# Patient Record
Sex: FEMALE | Race: WHITE | NOT HISPANIC OR LATINO | Employment: OTHER | URBAN - METROPOLITAN AREA
[De-identification: names, ages, dates, MRNs, and addresses within clinical notes are randomized per-mention and may not be internally consistent; named-entity substitution may affect disease eponyms.]

---

## 2017-08-09 RX ORDER — GABAPENTIN 400 MG/1
300 CAPSULE ORAL 3 TIMES DAILY
COMMUNITY
End: 2023-02-03

## 2017-08-09 RX ORDER — FENTANYL 100 UG/H
1 PATCH TRANSDERMAL
COMMUNITY
End: 2017-08-25

## 2017-08-10 ENCOUNTER — OFFICE VISIT (OUTPATIENT)
Dept: NEUROSURGERY | Facility: CLINIC | Age: 67
End: 2017-08-10

## 2017-08-10 VITALS — HEIGHT: 64 IN | TEMPERATURE: 96.7 F | BODY MASS INDEX: 21.17 KG/M2 | WEIGHT: 124 LBS

## 2017-08-10 DIAGNOSIS — M51.36 DDD (DEGENERATIVE DISC DISEASE), LUMBAR: ICD-10-CM

## 2017-08-10 DIAGNOSIS — M48.062 LUMBAR STENOSIS WITH NEUROGENIC CLAUDICATION: Primary | ICD-10-CM

## 2017-08-10 PROCEDURE — 99203 OFFICE O/P NEW LOW 30 MIN: CPT | Performed by: NEUROLOGICAL SURGERY

## 2017-08-10 NOTE — PROGRESS NOTES
Subjective   Patient ID: Glenda Brown is a 66 y.o. female is being seen for consultation today at the request of Ranjan Felix MD  Chief Complaint: Back and leg pain    History of Present Illness: The patient is a 66-year-old woman with a complaint of back pain that radiates to both hips and legs, particularly with standing and walking.  She has been treated for back pain for approximately 10 years, with pain management for approximately 8 years.  She has had difficulty taking OxyContin and morphine long-acting, with report of overdosing on each.  She currently uses fentanyl cutaneous patch 75 µg per hour, supplemented by oxycodone 15 mg 4 times a day if needed.  She also takes gabapentin 404 times a day and cyclobenzaprine 10 mg at bedtime as needed.  Her symptoms of pain have change in the past year and become unresponsive to the treatments, which have included epidural injections, facet injections, facet rhizotomy, and physical therapy.    Review of Radiographic Studies:   Lumbar MRI scan shows a moderately severe stenosis at L4 5 is the principal cause of what appears to be neurogenic claudication.    The following portions of the patient's history were reviewed, updated as appropriate and approved: allergies, current medications, past family history, past medical history, past social history, past surgical history, review of systems and problem list.    Review of Systems   Constitutional: Positive for activity change, appetite change, chills, fatigue and unexpected weight change. Negative for diaphoresis and fever.   HENT: Negative for congestion, dental problem, drooling, ear discharge, ear pain, facial swelling, hearing loss, mouth sores, nosebleeds, postnasal drip, rhinorrhea, sinus pressure, sneezing, sore throat, tinnitus, trouble swallowing and voice change.    Eyes: Negative for photophobia, pain, discharge, redness, itching and visual disturbance.   Respiratory: Positive for cough. Negative for  apnea, choking, chest tightness, shortness of breath, wheezing and stridor.    Cardiovascular: Negative for chest pain, palpitations and leg swelling.   Gastrointestinal: Positive for nausea. Negative for abdominal distention, abdominal pain, anal bleeding, blood in stool, constipation, diarrhea, rectal pain and vomiting.   Endocrine: Positive for cold intolerance and polydipsia. Negative for heat intolerance, polyphagia and polyuria.   Genitourinary: Negative for decreased urine volume, difficulty urinating, dysuria, enuresis, flank pain, frequency, genital sores, hematuria and urgency.   Musculoskeletal: Positive for arthralgias, back pain, gait problem and myalgias. Negative for joint swelling, neck pain and neck stiffness.   Skin: Negative for color change, pallor, rash and wound.   Allergic/Immunologic: Negative for environmental allergies, food allergies and immunocompromised state.   Neurological: Positive for weakness and headaches. Negative for dizziness, tremors, seizures, syncope, facial asymmetry, speech difficulty, light-headedness and numbness.   Hematological: Negative for adenopathy. Does not bruise/bleed easily.   Psychiatric/Behavioral: Positive for dysphoric mood and sleep disturbance. Negative for agitation, behavioral problems, confusion, decreased concentration, hallucinations, self-injury and suicidal ideas. The patient is nervous/anxious. The patient is not hyperactive.    All other systems reviewed and are negative.      Objective     NEUROLOGICAL EXAMINATION:      MENTAL STATUS:  Alert and oriented.  Speech intact.  Recent and remote memory intact.      CRANIAL NERVES:  Cranial nerve II:  Visual fields are full to confrontation.  Cranial nerves III, IV and VI:  PERRLADC.  Extraocular movements are intact.  Nystagmus is not present.  Cranial nerve V:  Facial sensation is intact to light touch.  Cranial nerve VII:  Muscles of facial expression reveal no asymmetry.  Cranial nerve VIII:   Hearing is intact to finger rub bilaterally.  Cranial nerves IX and X:  Palate elevates symmetrically.  Cranial nerve XI:  Shoulder shrug is intact.  Cranial nerve XII:  Tongue is midline without evidence of atrophy or fasciculation.    MUSCULOSKELETAL:  SLR negative. Kody's test negative.    MOTOR:  Deltoid, biceps, triceps, and  strength intact.  No hand atrophy.  Hip flexion, knee extension, ankle dorsiflexion and plantar flexion intact. No tremor, spasticity, ataxia, or dysmetria.    SENSATION:  Intact to touch upper and lower extremities.  Position sense intact.    REFLEXES:  DTR trace in the knees and absent in the ankles.      Assessment   Chronic back pain, now complicated by neurogenic claudication, with moderately severe stenosis at L4 5.       Plan   Minimal access decompressive laminectomy L4 5 to relieve the neurogenic claudication.  This should return her to a state in which her chronic pain management should be effective for the relief that she needs.       Anders Aponte MD

## 2017-08-11 PROBLEM — M48.062 LUMBAR STENOSIS WITH NEUROGENIC CLAUDICATION: Status: ACTIVE | Noted: 2017-08-11

## 2017-08-15 DIAGNOSIS — M48.062 SPINAL STENOSIS, LUMBAR REGION, WITH NEUROGENIC CLAUDICATION: Primary | ICD-10-CM

## 2017-08-15 DIAGNOSIS — R79.9 ABNORMAL FINDING OF BLOOD CHEMISTRY: ICD-10-CM

## 2017-08-15 RX ORDER — SODIUM CHLORIDE, SODIUM LACTATE, POTASSIUM CHLORIDE, CALCIUM CHLORIDE 600; 310; 30; 20 MG/100ML; MG/100ML; MG/100ML; MG/100ML
100 INJECTION, SOLUTION INTRAVENOUS CONTINUOUS
Status: CANCELLED | OUTPATIENT
Start: 2017-08-15

## 2017-08-15 RX ORDER — CHLORHEXIDINE GLUCONATE 4 G/100ML
SOLUTION TOPICAL
Qty: 120 ML | Status: ON HOLD
Start: 2017-08-15 | End: 2017-09-01

## 2017-08-15 RX ORDER — SODIUM CHLORIDE 0.9 % (FLUSH) 0.9 %
1-10 SYRINGE (ML) INJECTION AS NEEDED
Status: CANCELLED | OUTPATIENT
Start: 2017-08-15

## 2017-08-15 NOTE — H&P
HISTORY AND PHYSICAL for SURGERY    Referring Provider: Ranjan Felix MD    Chief complaint: Back and leg pain    Subjective .   History of present illness:    The patient is a 66-year-old woman with a complaint of back pain that radiates to both hips and legs, particularly with standing and walking.  She has been treated for back pain for approximately 10 years, with pain management for approximately 8 years.  She has had difficulty taking OxyContin and morphine long-acting, with report of overdosing on each.  She currently uses fentanyl cutaneous patch 75 µg per hour, supplemented by oxycodone 15 mg 4 times a day if needed.  She also takes gabapentin 404 times a day and cyclobenzaprine 10 mg at bedtime as needed.  Her symptoms of pain have change in the past year and become unresponsive to the treatments, which have included epidural injections, facet injections, facet rhizotomy, and physical therapy.    History  Past Medical History:   Diagnosis Date   • Headache    • Low back pain    • 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    • Other intervertebral disc degeneration, lumbar region    • Other spondylosis with radiculopathy, lumbar region    • Radiculopathy of lumbar region    • Spondylosis without myelopathy or radiculopathy, lumbar region    • Thyroid disease     No past surgical history on file. Family history is unknown by patient.   Social History     Social History   • Marital status:      Spouse name: N/A   • Number of children: N/A   • Years of education: N/A     Social History Main Topics   • Smoking status: Current Every Day Smoker   • Smokeless tobacco: Not on file   • Alcohol use No   • Drug use: No   • Sexual activity: Defer     Other Topics Concern   • Not on file     Social History Narrative       Allergies:  Morphine sulfate and Oxycontin [oxycodone hcl]    Objective   Review of Systems  Constitutional: Positive for activity change, appetite change,  chills, fatigue and unexpected weight change. Negative for diaphoresis and fever.   HENT: Negative for congestion, dental problem, drooling, ear discharge, ear pain, facial swelling, hearing loss, mouth sores, nosebleeds, postnasal drip, rhinorrhea, sinus pressure, sneezing, sore throat, tinnitus, trouble swallowing and voice change.    Eyes: Negative for photophobia, pain, discharge, redness, itching and visual disturbance.   Respiratory: Positive for cough. Negative for apnea, choking, chest tightness, shortness of breath, wheezing and stridor.    Cardiovascular: Negative for chest pain, palpitations and leg swelling.   Gastrointestinal: Positive for nausea. Negative for abdominal distention, abdominal pain, anal bleeding, blood in stool, constipation, diarrhea, rectal pain and vomiting.   Endocrine: Positive for cold intolerance and polydipsia. Negative for heat intolerance, polyphagia and polyuria.   Genitourinary: Negative for decreased urine volume, difficulty urinating, dysuria, enuresis, flank pain, frequency, genital sores, hematuria and urgency.   Musculoskeletal: Positive for arthralgias, back pain, gait problem and myalgias. Negative for joint swelling, neck pain and neck stiffness.   Skin: Negative for color change, pallor, rash and wound.   Allergic/Immunologic: Negative for environmental allergies, food allergies and immunocompromised state.   Neurological: Positive for weakness and headaches. Negative for dizziness, tremors, seizures, syncope, facial asymmetry, speech difficulty, light-headedness and numbness.   Hematological: Negative for adenopathy. Does not bruise/bleed easily.   Psychiatric/Behavioral: Positive for dysphoric mood and sleep disturbance. Negative for agitation, behavioral problems, confusion, decreased concentration, hallucinations, self-injury and suicidal ideas. The patient is nervous/anxious. The patient is not hyperactive.     Physical Exam:  NEUROLOGICAL EXAMINATION:        MENTAL STATUS:  Alert and oriented.  Speech intact.  Recent and remote memory intact.       CRANIAL NERVES:  Cranial nerve II:  Visual fields are full to confrontation.  Cranial nerves III, IV and VI:  PERRLADC.  Extraocular movements are intact.  Nystagmus is not present.  Cranial nerve V:  Facial sensation is intact to light touch.  Cranial nerve VII:  Muscles of facial expression reveal no asymmetry.  Cranial nerve VIII:  Hearing is intact to finger rub bilaterally.  Cranial nerves IX and X:  Palate elevates symmetrically.  Cranial nerve XI:  Shoulder shrug is intact.  Cranial nerve XII:  Tongue is midline without evidence of atrophy or fasciculation.     MUSCULOSKELETAL:  SLR negative. Kody's test negative.     MOTOR:  Deltoid, biceps, triceps, and  strength intact.  No hand atrophy.  Hip flexion, knee extension, ankle dorsiflexion and plantar flexion intact. No tremor, spasticity, ataxia, or dysmetria.     SENSATION:  Intact to touch upper and lower extremities.  Position sense intact.     REFLEXES:  DTR trace in the knees and absent in the ankles.      Results Review:  Lumbar MRI scan shows a moderately severe stenosis at L4 5 is the principal cause of what appears to be neurogenic claudication.    Assessment/Plan   Assessment       Chronic back pain, now complicated by neurogenic claudication, with moderately severe stenosis at L4 5.     Plan       Minimal access decompressive laminectomy L4 5 to relieve the neurogenic claudication.  This should return her to a state in which her chronic pain management should be effective for the relief that she needs.      Teresa Steele PA-C  On behalf of Anders Aponte MD  08/15/17  10:29 AM

## 2017-08-25 ENCOUNTER — APPOINTMENT (OUTPATIENT)
Dept: PREADMISSION TESTING | Facility: HOSPITAL | Age: 67
End: 2017-08-25

## 2017-08-25 VITALS — BODY MASS INDEX: 23.31 KG/M2 | WEIGHT: 123.46 LBS | HEIGHT: 61 IN

## 2017-08-25 DIAGNOSIS — R79.9 ABNORMAL FINDING OF BLOOD CHEMISTRY: ICD-10-CM

## 2017-08-25 DIAGNOSIS — M48.062 SPINAL STENOSIS, LUMBAR REGION, WITH NEUROGENIC CLAUDICATION: ICD-10-CM

## 2017-08-25 LAB
ALBUMIN SERPL-MCNC: 3.7 G/DL (ref 3.2–4.8)
ALBUMIN/GLOB SERPL: 1.4 G/DL (ref 1.5–2.5)
ALP SERPL-CCNC: 115 U/L (ref 25–100)
ALT SERPL W P-5'-P-CCNC: 7 U/L (ref 7–40)
ANION GAP SERPL CALCULATED.3IONS-SCNC: -4 MMOL/L (ref 3–11)
AST SERPL-CCNC: 15 U/L (ref 0–33)
BILIRUB SERPL-MCNC: 0.3 MG/DL (ref 0.3–1.2)
BUN BLD-MCNC: 10 MG/DL (ref 9–23)
BUN/CREAT SERPL: 14.3 (ref 7–25)
CALCIUM SPEC-SCNC: 9.8 MG/DL (ref 8.7–10.4)
CHLORIDE SERPL-SCNC: 107 MMOL/L (ref 99–109)
CO2 SERPL-SCNC: 36 MMOL/L (ref 20–31)
CREAT BLD-MCNC: 0.7 MG/DL (ref 0.6–1.3)
DEPRECATED RDW RBC AUTO: 56.9 FL (ref 37–54)
ERYTHROCYTE [DISTWIDTH] IN BLOOD BY AUTOMATED COUNT: 15.9 % (ref 11.3–14.5)
GFR SERPL CREATININE-BSD FRML MDRD: 84 ML/MIN/1.73
GLOBULIN UR ELPH-MCNC: 2.7 GM/DL
GLUCOSE BLD-MCNC: 97 MG/DL (ref 70–100)
HBA1C MFR BLD: 6 % (ref 4.8–5.6)
HCT VFR BLD AUTO: 42.8 % (ref 34.5–44)
HGB BLD-MCNC: 14 G/DL (ref 11.5–15.5)
MCH RBC QN AUTO: 31.9 PG (ref 27–31)
MCHC RBC AUTO-ENTMCNC: 32.7 G/DL (ref 32–36)
MCV RBC AUTO: 97.5 FL (ref 80–99)
PLATELET # BLD AUTO: 210 10*3/MM3 (ref 150–450)
PMV BLD AUTO: 10.5 FL (ref 6–12)
POTASSIUM BLD-SCNC: 4.3 MMOL/L (ref 3.5–5.5)
PROT SERPL-MCNC: 6.4 G/DL (ref 5.7–8.2)
RBC # BLD AUTO: 4.39 10*6/MM3 (ref 3.89–5.14)
SODIUM BLD-SCNC: 139 MMOL/L (ref 132–146)
WBC NRBC COR # BLD: 7.17 10*3/MM3 (ref 3.5–10.8)

## 2017-08-25 PROCEDURE — 80053 COMPREHEN METABOLIC PANEL: CPT | Performed by: NEUROLOGICAL SURGERY

## 2017-08-25 PROCEDURE — 85027 COMPLETE CBC AUTOMATED: CPT | Performed by: NEUROLOGICAL SURGERY

## 2017-08-25 PROCEDURE — 36415 COLL VENOUS BLD VENIPUNCTURE: CPT

## 2017-08-25 PROCEDURE — 83036 HEMOGLOBIN GLYCOSYLATED A1C: CPT | Performed by: NEUROLOGICAL SURGERY

## 2017-08-25 PROCEDURE — 93010 ELECTROCARDIOGRAM REPORT: CPT | Performed by: INTERNAL MEDICINE

## 2017-08-25 PROCEDURE — 93005 ELECTROCARDIOGRAM TRACING: CPT

## 2017-08-25 PROCEDURE — 87081 CULTURE SCREEN ONLY: CPT | Performed by: NEUROLOGICAL SURGERY

## 2017-08-25 RX ORDER — RANITIDINE 150 MG/1
150 TABLET ORAL AS NEEDED
COMMUNITY
End: 2020-01-23

## 2017-08-25 RX ORDER — LEVOTHYROXINE SODIUM 0.05 MG/1
TABLET ORAL DAILY
COMMUNITY
End: 2022-08-25

## 2017-08-25 RX ORDER — FENTANYL 75 UG/H
1 PATCH TRANSDERMAL
COMMUNITY
End: 2023-02-03

## 2017-08-27 LAB — MRSA SPEC QL CULT: NORMAL

## 2017-08-31 ENCOUNTER — ANESTHESIA EVENT (OUTPATIENT)
Dept: PERIOP | Facility: HOSPITAL | Age: 67
End: 2017-08-31

## 2017-08-31 RX ORDER — FAMOTIDINE 10 MG/ML
20 INJECTION, SOLUTION INTRAVENOUS ONCE
Status: CANCELLED | OUTPATIENT
Start: 2017-08-31 | End: 2017-08-31

## 2017-09-01 ENCOUNTER — HOSPITAL ENCOUNTER (OUTPATIENT)
Facility: HOSPITAL | Age: 67
Discharge: HOME | End: 2017-09-02
Attending: NEUROLOGICAL SURGERY | Admitting: NEUROLOGICAL SURGERY

## 2017-09-01 ENCOUNTER — APPOINTMENT (OUTPATIENT)
Dept: GENERAL RADIOLOGY | Facility: HOSPITAL | Age: 67
End: 2017-09-01

## 2017-09-01 ENCOUNTER — ANESTHESIA (OUTPATIENT)
Dept: PERIOP | Facility: HOSPITAL | Age: 67
End: 2017-09-01

## 2017-09-01 DIAGNOSIS — Z74.09 IMPAIRED MOBILITY AND ADLS: Primary | ICD-10-CM

## 2017-09-01 DIAGNOSIS — M48.062 SPINAL STENOSIS, LUMBAR REGION, WITH NEUROGENIC CLAUDICATION: ICD-10-CM

## 2017-09-01 DIAGNOSIS — Z74.09 IMPAIRED FUNCTIONAL MOBILITY, BALANCE, GAIT, AND ENDURANCE: ICD-10-CM

## 2017-09-01 DIAGNOSIS — Z78.9 IMPAIRED MOBILITY AND ADLS: Primary | ICD-10-CM

## 2017-09-01 PROBLEM — M51.26 HERNIATION OF LUMBAR INTERVERTEBRAL DISC: Status: ACTIVE | Noted: 2017-09-01

## 2017-09-01 PROCEDURE — 25010000002 NEOSTIGMINE PER 0.5 MG: Performed by: NURSE ANESTHETIST, CERTIFIED REGISTERED

## 2017-09-01 PROCEDURE — A9270 NON-COVERED ITEM OR SERVICE: HCPCS | Performed by: NEUROLOGICAL SURGERY

## 2017-09-01 PROCEDURE — 63710000001 FENTANYL 75 MCG/HR PATCH 72 HOUR: Performed by: NEUROLOGICAL SURGERY

## 2017-09-01 PROCEDURE — 25010000002 FENTANYL CITRATE (PF) 100 MCG/2ML SOLUTION: Performed by: NURSE ANESTHETIST, CERTIFIED REGISTERED

## 2017-09-01 PROCEDURE — 25010000002 PROPOFOL 10 MG/ML EMULSION: Performed by: NURSE ANESTHETIST, CERTIFIED REGISTERED

## 2017-09-01 PROCEDURE — 25010000002 MIDAZOLAM PER 1 MG: Performed by: ANESTHESIOLOGY

## 2017-09-01 PROCEDURE — 63710000001 GABAPENTIN 300 MG CAPSULE: Performed by: NEUROLOGICAL SURGERY

## 2017-09-01 PROCEDURE — 63710000001 FAMOTIDINE 20 MG TABLET: Performed by: ANESTHESIOLOGY

## 2017-09-01 PROCEDURE — 25010000002 DEXAMETHASONE PER 1 MG: Performed by: NURSE ANESTHETIST, CERTIFIED REGISTERED

## 2017-09-01 PROCEDURE — C2617 STENT, NON-COR, TEM W/O DEL: HCPCS | Performed by: NEUROLOGICAL SURGERY

## 2017-09-01 PROCEDURE — 25010000002 ONDANSETRON PER 1 MG: Performed by: NURSE ANESTHETIST, CERTIFIED REGISTERED

## 2017-09-01 PROCEDURE — 63710000001 LEVOTHYROXINE 50 MCG TABLET: Performed by: NEUROLOGICAL SURGERY

## 2017-09-01 PROCEDURE — A9270 NON-COVERED ITEM OR SERVICE: HCPCS | Performed by: ANESTHESIOLOGY

## 2017-09-01 PROCEDURE — 25010000003 CEFAZOLIN IN DEXTROSE 2-4 GM/100ML-% SOLUTION: Performed by: NEUROLOGICAL SURGERY

## 2017-09-01 PROCEDURE — 63047 LAM FACETEC & FORAMOT LUMBAR: CPT | Performed by: NEUROLOGICAL SURGERY

## 2017-09-01 PROCEDURE — 25010000002 FENTANYL CITRATE (PF) 100 MCG/2ML SOLUTION: Performed by: ANESTHESIOLOGY

## 2017-09-01 PROCEDURE — 25010000002 HYDROMORPHONE PER 4 MG: Performed by: ANESTHESIOLOGY

## 2017-09-01 PROCEDURE — 25010000002 HYDROMORPHONE PER 4 MG: Performed by: NEUROLOGICAL SURGERY

## 2017-09-01 PROCEDURE — 63710000001 OXYCODONE 15 MG TABLET: Performed by: NEUROLOGICAL SURGERY

## 2017-09-01 PROCEDURE — 76000 FLUOROSCOPY <1 HR PHYS/QHP: CPT

## 2017-09-01 PROCEDURE — 94799 UNLISTED PULMONARY SVC/PX: CPT

## 2017-09-01 RX ORDER — ONDANSETRON 2 MG/ML
4 INJECTION INTRAMUSCULAR; INTRAVENOUS ONCE AS NEEDED
Status: DISCONTINUED | OUTPATIENT
Start: 2017-09-01 | End: 2017-09-01 | Stop reason: HOSPADM

## 2017-09-01 RX ORDER — MAGNESIUM HYDROXIDE 1200 MG/15ML
LIQUID ORAL AS NEEDED
Status: DISCONTINUED | OUTPATIENT
Start: 2017-09-01 | End: 2017-09-01 | Stop reason: HOSPADM

## 2017-09-01 RX ORDER — ONDANSETRON 2 MG/ML
INJECTION INTRAMUSCULAR; INTRAVENOUS AS NEEDED
Status: DISCONTINUED | OUTPATIENT
Start: 2017-09-01 | End: 2017-09-01 | Stop reason: SURG

## 2017-09-01 RX ORDER — CEFAZOLIN SODIUM 2 G/100ML
2 INJECTION, SOLUTION INTRAVENOUS ONCE
Status: COMPLETED | OUTPATIENT
Start: 2017-09-01 | End: 2017-09-01

## 2017-09-01 RX ORDER — DOCUSATE SODIUM 100 MG/1
100 CAPSULE, LIQUID FILLED ORAL 2 TIMES DAILY PRN
Status: DISCONTINUED | OUTPATIENT
Start: 2017-09-01 | End: 2017-09-02 | Stop reason: HOSPADM

## 2017-09-01 RX ORDER — SODIUM CHLORIDE 0.9 % (FLUSH) 0.9 %
1-10 SYRINGE (ML) INJECTION AS NEEDED
Status: DISCONTINUED | OUTPATIENT
Start: 2017-09-01 | End: 2017-09-01 | Stop reason: SDUPTHER

## 2017-09-01 RX ORDER — MIDAZOLAM HYDROCHLORIDE 1 MG/ML
1 INJECTION INTRAMUSCULAR; INTRAVENOUS
Status: COMPLETED | OUTPATIENT
Start: 2017-09-01 | End: 2017-09-01

## 2017-09-01 RX ORDER — BISACODYL 10 MG
10 SUPPOSITORY, RECTAL RECTAL DAILY PRN
Status: DISCONTINUED | OUTPATIENT
Start: 2017-09-01 | End: 2017-09-02 | Stop reason: HOSPADM

## 2017-09-01 RX ORDER — ONDANSETRON 2 MG/ML
4 INJECTION INTRAMUSCULAR; INTRAVENOUS EVERY 6 HOURS PRN
Status: DISCONTINUED | OUTPATIENT
Start: 2017-09-01 | End: 2017-09-02 | Stop reason: HOSPADM

## 2017-09-01 RX ORDER — GABAPENTIN 300 MG/1
300 CAPSULE ORAL 3 TIMES DAILY
Status: DISCONTINUED | OUTPATIENT
Start: 2017-09-01 | End: 2017-09-02 | Stop reason: HOSPADM

## 2017-09-01 RX ORDER — FAMOTIDINE 10 MG/ML
20 INJECTION, SOLUTION INTRAVENOUS ONCE
Status: DISCONTINUED | OUTPATIENT
Start: 2017-09-01 | End: 2017-09-01 | Stop reason: SDUPTHER

## 2017-09-01 RX ORDER — BUPIVACAINE HYDROCHLORIDE AND EPINEPHRINE 2.5; 5 MG/ML; UG/ML
INJECTION, SOLUTION EPIDURAL; INFILTRATION; INTRACAUDAL; PERINEURAL AS NEEDED
Status: DISCONTINUED | OUTPATIENT
Start: 2017-09-01 | End: 2017-09-01 | Stop reason: HOSPADM

## 2017-09-01 RX ORDER — FAMOTIDINE 20 MG/1
20 TABLET, FILM COATED ORAL ONCE
Status: COMPLETED | OUTPATIENT
Start: 2017-09-01 | End: 2017-09-01

## 2017-09-01 RX ORDER — LIDOCAINE HYDROCHLORIDE 10 MG/ML
0.5 INJECTION, SOLUTION EPIDURAL; INFILTRATION; INTRACAUDAL; PERINEURAL ONCE AS NEEDED
Status: COMPLETED | OUTPATIENT
Start: 2017-09-01 | End: 2017-09-01

## 2017-09-01 RX ORDER — ATORVASTATIN CALCIUM 10 MG/1
10 TABLET, FILM COATED ORAL DAILY
Status: DISCONTINUED | OUTPATIENT
Start: 2017-09-02 | End: 2017-09-02 | Stop reason: HOSPADM

## 2017-09-01 RX ORDER — HYDROMORPHONE HYDROCHLORIDE 1 MG/ML
0.5 INJECTION, SOLUTION INTRAMUSCULAR; INTRAVENOUS; SUBCUTANEOUS
Status: DISCONTINUED | OUTPATIENT
Start: 2017-09-01 | End: 2017-09-01 | Stop reason: HOSPADM

## 2017-09-01 RX ORDER — SODIUM CHLORIDE, SODIUM LACTATE, POTASSIUM CHLORIDE, CALCIUM CHLORIDE 600; 310; 30; 20 MG/100ML; MG/100ML; MG/100ML; MG/100ML
100 INJECTION, SOLUTION INTRAVENOUS CONTINUOUS
Status: DISCONTINUED | OUTPATIENT
Start: 2017-09-01 | End: 2017-09-02 | Stop reason: HOSPADM

## 2017-09-01 RX ORDER — FAMOTIDINE 20 MG/1
20 TABLET, FILM COATED ORAL DAILY
Status: DISCONTINUED | OUTPATIENT
Start: 2017-09-02 | End: 2017-09-02 | Stop reason: HOSPADM

## 2017-09-01 RX ORDER — DIPHENHYDRAMINE HCL 25 MG
25 CAPSULE ORAL NIGHTLY PRN
Status: DISCONTINUED | OUTPATIENT
Start: 2017-09-01 | End: 2017-09-02 | Stop reason: HOSPADM

## 2017-09-01 RX ORDER — OXYCODONE HYDROCHLORIDE 15 MG/1
15 TABLET ORAL 4 TIMES DAILY PRN
Status: DISCONTINUED | OUTPATIENT
Start: 2017-09-01 | End: 2017-09-02 | Stop reason: HOSPADM

## 2017-09-01 RX ORDER — GLYCOPYRROLATE 0.2 MG/ML
INJECTION INTRAMUSCULAR; INTRAVENOUS AS NEEDED
Status: DISCONTINUED | OUTPATIENT
Start: 2017-09-01 | End: 2017-09-01 | Stop reason: SURG

## 2017-09-01 RX ORDER — DEXAMETHASONE SODIUM PHOSPHATE 4 MG/ML
INJECTION, SOLUTION INTRA-ARTICULAR; INTRALESIONAL; INTRAMUSCULAR; INTRAVENOUS; SOFT TISSUE AS NEEDED
Status: DISCONTINUED | OUTPATIENT
Start: 2017-09-01 | End: 2017-09-01 | Stop reason: SURG

## 2017-09-01 RX ORDER — SODIUM CHLORIDE 0.9 % (FLUSH) 0.9 %
1-10 SYRINGE (ML) INJECTION AS NEEDED
Status: DISCONTINUED | OUTPATIENT
Start: 2017-09-01 | End: 2017-09-01 | Stop reason: HOSPADM

## 2017-09-01 RX ORDER — FENTANYL CITRATE 50 UG/ML
50 INJECTION, SOLUTION INTRAMUSCULAR; INTRAVENOUS
Status: COMPLETED | OUTPATIENT
Start: 2017-09-01 | End: 2017-09-01

## 2017-09-01 RX ORDER — IBUPROFEN 600 MG/1
600 TABLET ORAL EVERY 6 HOURS PRN
Status: DISCONTINUED | OUTPATIENT
Start: 2017-09-01 | End: 2017-09-02 | Stop reason: HOSPADM

## 2017-09-01 RX ORDER — KETAMINE HCL IN 0.9 % NACL 50 MG/5 ML
15 SYRINGE (ML) INTRAVENOUS ONCE
Status: COMPLETED | OUTPATIENT
Start: 2017-09-01 | End: 2017-09-01

## 2017-09-01 RX ORDER — ATRACURIUM BESYLATE 10 MG/ML
INJECTION, SOLUTION INTRAVENOUS AS NEEDED
Status: DISCONTINUED | OUTPATIENT
Start: 2017-09-01 | End: 2017-09-01 | Stop reason: SURG

## 2017-09-01 RX ORDER — FAMOTIDINE 20 MG/1
20 TABLET, FILM COATED ORAL ONCE
Status: DISCONTINUED | OUTPATIENT
Start: 2017-09-01 | End: 2017-09-01 | Stop reason: SDUPTHER

## 2017-09-01 RX ORDER — LIDOCAINE HYDROCHLORIDE 10 MG/ML
INJECTION, SOLUTION INFILTRATION; PERINEURAL AS NEEDED
Status: DISCONTINUED | OUTPATIENT
Start: 2017-09-01 | End: 2017-09-01 | Stop reason: SURG

## 2017-09-01 RX ORDER — ACETAMINOPHEN 325 MG/1
650 TABLET ORAL EVERY 4 HOURS PRN
Status: DISCONTINUED | OUTPATIENT
Start: 2017-09-01 | End: 2017-09-02 | Stop reason: HOSPADM

## 2017-09-01 RX ORDER — SODIUM CHLORIDE, SODIUM LACTATE, POTASSIUM CHLORIDE, CALCIUM CHLORIDE 600; 310; 30; 20 MG/100ML; MG/100ML; MG/100ML; MG/100ML
9 INJECTION, SOLUTION INTRAVENOUS CONTINUOUS
Status: DISCONTINUED | OUTPATIENT
Start: 2017-09-01 | End: 2017-09-01 | Stop reason: SDUPTHER

## 2017-09-01 RX ORDER — PROMETHAZINE HYDROCHLORIDE 25 MG/1
25 TABLET ORAL DAILY PRN
Status: DISCONTINUED | OUTPATIENT
Start: 2017-09-01 | End: 2017-09-02 | Stop reason: HOSPADM

## 2017-09-01 RX ORDER — BISACODYL 5 MG/1
5 TABLET, DELAYED RELEASE ORAL DAILY PRN
Status: DISCONTINUED | OUTPATIENT
Start: 2017-09-01 | End: 2017-09-02 | Stop reason: HOSPADM

## 2017-09-01 RX ORDER — FENTANYL 75 UG/H
1 PATCH TRANSDERMAL
Status: DISCONTINUED | OUTPATIENT
Start: 2017-09-01 | End: 2017-09-02 | Stop reason: HOSPADM

## 2017-09-01 RX ORDER — PROPOFOL 10 MG/ML
VIAL (ML) INTRAVENOUS AS NEEDED
Status: DISCONTINUED | OUTPATIENT
Start: 2017-09-01 | End: 2017-09-01 | Stop reason: SURG

## 2017-09-01 RX ORDER — LEVOTHYROXINE SODIUM 0.05 MG/1
50 TABLET ORAL DAILY
Status: DISCONTINUED | OUTPATIENT
Start: 2017-09-01 | End: 2017-09-02 | Stop reason: HOSPADM

## 2017-09-01 RX ORDER — SENNA AND DOCUSATE SODIUM 50; 8.6 MG/1; MG/1
1 TABLET, FILM COATED ORAL NIGHTLY PRN
Status: DISCONTINUED | OUTPATIENT
Start: 2017-09-01 | End: 2017-09-02 | Stop reason: HOSPADM

## 2017-09-01 RX ORDER — CEFAZOLIN SODIUM 2 G/100ML
2 INJECTION, SOLUTION INTRAVENOUS EVERY 8 HOURS
Status: COMPLETED | OUTPATIENT
Start: 2017-09-01 | End: 2017-09-02

## 2017-09-01 RX ORDER — LIDOCAINE HYDROCHLORIDE 10 MG/ML
0.5 INJECTION, SOLUTION EPIDURAL; INFILTRATION; INTRACAUDAL; PERINEURAL ONCE AS NEEDED
Status: DISCONTINUED | OUTPATIENT
Start: 2017-09-01 | End: 2017-09-01 | Stop reason: SDUPTHER

## 2017-09-01 RX ORDER — ONDANSETRON 4 MG/1
4 TABLET, FILM COATED ORAL EVERY 6 HOURS PRN
Status: DISCONTINUED | OUTPATIENT
Start: 2017-09-01 | End: 2017-09-02 | Stop reason: HOSPADM

## 2017-09-01 RX ADMIN — SODIUM CHLORIDE, POTASSIUM CHLORIDE, SODIUM LACTATE AND CALCIUM CHLORIDE: 600; 310; 30; 20 INJECTION, SOLUTION INTRAVENOUS at 13:00

## 2017-09-01 RX ADMIN — FAMOTIDINE 20 MG: 20 TABLET ORAL at 09:14

## 2017-09-01 RX ADMIN — PROPOFOL 150 MG: 10 INJECTION, EMULSION INTRAVENOUS at 13:12

## 2017-09-01 RX ADMIN — FENTANYL CITRATE 50 MCG: 50 INJECTION, SOLUTION INTRAMUSCULAR; INTRAVENOUS at 11:07

## 2017-09-01 RX ADMIN — FENTANYL CITRATE 50 MCG: 50 INJECTION, SOLUTION INTRAMUSCULAR; INTRAVENOUS at 15:45

## 2017-09-01 RX ADMIN — FENTANYL CITRATE 50 MCG: 50 INJECTION, SOLUTION INTRAMUSCULAR; INTRAVENOUS at 10:32

## 2017-09-01 RX ADMIN — CEFAZOLIN SODIUM 2 G: 2 INJECTION, SOLUTION INTRAVENOUS at 13:09

## 2017-09-01 RX ADMIN — LIDOCAINE HYDROCHLORIDE 50 MG: 10 INJECTION, SOLUTION INFILTRATION; PERINEURAL at 13:12

## 2017-09-01 RX ADMIN — GABAPENTIN 300 MG: 300 CAPSULE ORAL at 23:24

## 2017-09-01 RX ADMIN — GLYCOPYRROLATE 0.4 MG: 0.2 INJECTION, SOLUTION INTRAMUSCULAR; INTRAVENOUS at 14:30

## 2017-09-01 RX ADMIN — FENTANYL CITRATE 50 MCG: 50 INJECTION, SOLUTION INTRAMUSCULAR; INTRAVENOUS at 15:10

## 2017-09-01 RX ADMIN — FENTANYL CITRATE 50 MCG: 50 INJECTION, SOLUTION INTRAMUSCULAR; INTRAVENOUS at 15:00

## 2017-09-01 RX ADMIN — LIDOCAINE HYDROCHLORIDE 0.5 ML: 10 INJECTION, SOLUTION EPIDURAL; INFILTRATION; INTRACAUDAL; PERINEURAL at 09:14

## 2017-09-01 RX ADMIN — FENTANYL CITRATE 50 MCG: 50 INJECTION, SOLUTION INTRAMUSCULAR; INTRAVENOUS at 09:05

## 2017-09-01 RX ADMIN — OXYCODONE HYDROCHLORIDE 15 MG: 15 TABLET ORAL at 17:23

## 2017-09-01 RX ADMIN — HYDROMORPHONE HYDROCHLORIDE 1 MG: 1 INJECTION, SOLUTION INTRAMUSCULAR; INTRAVENOUS; SUBCUTANEOUS at 21:52

## 2017-09-01 RX ADMIN — HYDROMORPHONE HYDROCHLORIDE 1 MG: 1 INJECTION, SOLUTION INTRAMUSCULAR; INTRAVENOUS; SUBCUTANEOUS at 19:14

## 2017-09-01 RX ADMIN — GABAPENTIN 300 MG: 300 CAPSULE ORAL at 16:58

## 2017-09-01 RX ADMIN — MIDAZOLAM HYDROCHLORIDE 1 MG: 1 INJECTION, SOLUTION INTRAMUSCULAR; INTRAVENOUS at 14:50

## 2017-09-01 RX ADMIN — KETAMINE HCL-NACL SOLN PREF SY 50 MG/5ML-0.9% (10MG/ML) 15 MG: 10 SOLUTION PREFILLED SYRINGE at 15:55

## 2017-09-01 RX ADMIN — OXYCODONE HYDROCHLORIDE 15 MG: 15 TABLET ORAL at 23:25

## 2017-09-01 RX ADMIN — ONDANSETRON 4 MG: 2 INJECTION INTRAMUSCULAR; INTRAVENOUS at 14:30

## 2017-09-01 RX ADMIN — ATRACURIUM BESYLATE 40 MG: 10 INJECTION, SOLUTION INTRAVENOUS at 13:12

## 2017-09-01 RX ADMIN — SODIUM CHLORIDE, POTASSIUM CHLORIDE, SODIUM LACTATE AND CALCIUM CHLORIDE 9 ML/HR: 600; 310; 30; 20 INJECTION, SOLUTION INTRAVENOUS at 09:14

## 2017-09-01 RX ADMIN — SODIUM CHLORIDE, POTASSIUM CHLORIDE, SODIUM LACTATE AND CALCIUM CHLORIDE 100 ML/HR: 600; 310; 30; 20 INJECTION, SOLUTION INTRAVENOUS at 15:09

## 2017-09-01 RX ADMIN — CEFAZOLIN SODIUM 2 G: 2 INJECTION, SOLUTION INTRAVENOUS at 20:22

## 2017-09-01 RX ADMIN — Medication 3 MG: at 14:30

## 2017-09-01 RX ADMIN — HYDROMORPHONE HYDROCHLORIDE 0.5 MG: 1 INJECTION, SOLUTION INTRAMUSCULAR; INTRAVENOUS; SUBCUTANEOUS at 16:10

## 2017-09-01 RX ADMIN — FENTANYL CITRATE 50 MCG: 50 INJECTION, SOLUTION INTRAMUSCULAR; INTRAVENOUS at 15:20

## 2017-09-01 RX ADMIN — MIDAZOLAM HYDROCHLORIDE 1 MG: 1 INJECTION, SOLUTION INTRAMUSCULAR; INTRAVENOUS at 15:00

## 2017-09-01 RX ADMIN — FENTANYL 1 PATCH: 75 PATCH, EXTENDED RELEASE TRANSDERMAL at 16:58

## 2017-09-01 RX ADMIN — DEXAMETHASONE SODIUM PHOSPHATE 4 MG: 4 INJECTION, SOLUTION INTRAMUSCULAR; INTRAVENOUS at 13:25

## 2017-09-01 RX ADMIN — LEVOTHYROXINE SODIUM 50 MCG: 50 TABLET ORAL at 16:58

## 2017-09-01 RX ADMIN — FENTANYL CITRATE 50 MCG: 50 INJECTION, SOLUTION INTRAMUSCULAR; INTRAVENOUS at 15:30

## 2017-09-01 NOTE — ANESTHESIA POSTPROCEDURE EVALUATION
Patient: Glenda Brown    Procedure Summary     Date Anesthesia Start Anesthesia Stop Room / Location    09/01/17 1300 1447  [facility] / [facility]       Procedure Diagnosis Surgeon Provider    LUMBAR LAMINECTOMY  L4-5 (N/A Spine Lumbar) Lumbar stenosis with neurogenic claudication  (Lumbar stenosis with neurogenic claudication [M48.06]) MD Yang Ventura MD          Anesthesia Type: general  Last vitals  /74       Temp     97.6   Pulse 80      Resp 16       SpO2     98     Post Anesthesia Care and Evaluation    Patient location during evaluation: PACU  Patient participation: complete - patient participated  Level of consciousness: sleepy but conscious  Pain score: 0  Pain management: adequate  Airway patency: patent  Anesthetic complications: No anesthetic complications  PONV Status: none  Cardiovascular status: hemodynamically stable and acceptable  Respiratory status: nonlabored ventilation, acceptable, nasal cannula and oral airway  Hydration status: acceptable

## 2017-09-01 NOTE — OP NOTE
OPERATIVE REPORT    DATE OF OPERATION: 9/1/17    PREOPERATIVE DIAGNOSIS: Lumbar stenosis L4-5    POSTOPERATIVE DIAGNOSIS: Same    PROCEDURE PERFORMED: Lumbar laminectomy L4-5    SURGEON: Anders Aponte MD    ASSISTANT: Teresa Steele PA-C    INDICATIONS: The patient had back pain with hip and leg pain consistent with neurogenic claudication.  MRI scan showed severe stenosis at L4 5.  Decompression was elected.    DESCRIPTON OF PROCEDURE: Surgery was performed under general anesthesia in the prone position on the laminectomy frame.  The back was prepared sterilely and draped.  The left L4 lamina was identified with a spinal needle and an AP fluoroscopic image.  An 18 mm incision was made and a guidewire and dilators were used to place a 4 cm length x 18 mm width tubular retractor.  AP fluoroscopy confirmed this to be the L4 5 level on the left.    A high-speed drill was used to perform the laminectomy, beginning on the ipsilateral side, and removing the inferior lamina of L4 up to the level of the L4 pedicle.  Drilling was extended to the floor lateral recess on the ipsilateral side, then the tube was angled across the midline to the contralateral side and drilling continued, removing the lamina until the ligamentum flavum was exposed and the lateral recess on the right side reached. The ligamentum flavum was exposured bilaterally. The laminectomy was extended cranially to the point where the ligamentum flavum thinned away and epidural fat was visible. The exposure was extended caudally to the level of the superior lamina of L5.  A 3 mm punch was used to remove the ligamentum flavum, beginning at the midline and partially removing the ligamentum on the ipsilateral side to expose the dura, then removing the ligamentum extensively into the lateral recess on the contralateral side until the floor of the lateral recess and the epidural veins were seen.  Gelfoam was placed in the far lateral recess for hemostasis.  The  tube was angled back to the ipsilateral side and the ligamentum flavum excision completed with the Kerrison punch until the floor the lateral recess and the epidural veins were seen and all dural compression was confirmed visually to be alleviated.  Removal of the ligament was extended caudally to the superior lamina of L5 and laterally to the level of the L5 pedicle. The ligamentum flavum was removed bilaterally across the superior margin of the L5 lamina. The bone was waxed as necessary for hemostasis.  The site was irrigated and Gelfoam used as necessary to complete hemostasis.  Because of a continued minor ooze of blood FloSeal was used to finally fill the laminectomy site and apply cottonoids into the lateral recess and over the bone surface to compress the FloSeal against it and this achieved complete hemostasis.  The tubular retractor was removed.  Marcaine was injected in the paraspinous muscle.  Subcuticular Vicryl closure was performed and Dermabond Prineo was applied to the skin.  Blood loss was 45 milliliters.    Anders Aponte M.D.

## 2017-09-01 NOTE — BRIEF OP NOTE
LUMBAR LAMINECTOMY DISCECTOMY MICRO ENDOSCOPIC  Procedure Note    Glenda Brown  9/1/2017    Pre-op Diagnosis:   Lumbar stenosis with neurogenic claudication [M48.06]    Post-op Diagnosis:     Post-Op Diagnosis Codes:     * Lumbar stenosis with neurogenic claudication [M48.06]    Procedure/CPT® Codes:      Procedure(s):  LUMBAR LAMINECTOMY  L4-5    Surgeon(s):  Anders Aponte MD    Anesthesia: General    Staff:   Circulator: Malgorzata Hoskins; Maya Harris RN  Scrub Person: Genesis Lemus  Nursing Assistant: Jerome Montes  Assistant: Teresa Steele PA-C    Estimated Blood Loss: *No blood loss documented*  Urine Voided: * No values recorded between 9/1/2017  1:00 PM and 9/1/2017  2:29 PM *    Specimens:                * No specimens in log *      Drains:           Findings: Stenosis L4-5    Complications: None      Anders Aponte MD     Date: 9/1/2017  Time: 2:29 PM

## 2017-09-01 NOTE — ANESTHESIA PREPROCEDURE EVALUATION
Anesthesia Evaluation     Patient summary reviewed and Nursing notes reviewed   NPO Solid Status: > 8 hours  NPO Liquid Status: > 8 hours     Airway   Mallampati: II  TM distance: >3 FB  Neck ROM: full  no difficulty expected  Dental      Pulmonary     breath sounds clear to auscultation  Cardiovascular     Rhythm: regular  Rate: normal        Neuro/Psych  (+) headaches, numbness,    GI/Hepatic/Renal/Endo    (+)  GERD,     Musculoskeletal     Abdominal    Substance History      OB/GYN          Other   (+) arthritis     ROS/Med Hx Other: Chronic back pain                                  Anesthesia Plan    ASA 3     general     intravenous induction   Anesthetic plan and risks discussed with patient.    Plan discussed with CRNA.

## 2017-09-01 NOTE — ANESTHESIA PROCEDURE NOTES
Airway  Urgency: elective    Airway not difficult    General Information and Staff    Patient location during procedure: OR  CRNA: JACK HAMILTON    Indications and Patient Condition  Indications for airway management: airway protection    Preoxygenated: yes  MILS not maintained throughout  Mask difficulty assessment: 1 - vent by mask    Final Airway Details  Final airway type: endotracheal airway      Successful airway: ETT  Cuffed: yes   Successful intubation technique: direct laryngoscopy  Facilitating devices/methods: intubating stylet  Endotracheal tube insertion site: oral  Blade: Briones  Blade size: #2  ETT size: 7.0 mm  Cormack-Lehane Classification: grade I - full view of glottis  Placement verified by: chest auscultation and capnometry   Measured from: lips  ETT to lips (cm): 20  Number of attempts at approach: 1    Additional Comments  Negative epigastric sounds, Breath sound equal bilaterally with symmetric chest rise and fall.  Atraumatic

## 2017-09-01 NOTE — INTERVAL H&P NOTE
"Pre-Op H&P (See Recent Office Note Attached)    Chief complaint: Back and BLE pain, R>L      Review of Systems:  General ROS:  no fever, chills, rashes, No change since last office visit  Cardiovascular ROS: no chest pain or dyspnea on exertion  Respiratory ROS: no cough,+baseline shortness of breath, or wheezing    Immunization History:   Influenza:  Yes 2016  Pneumococcal:  Yes < 5 years  Tetanus:  unknown    Vital Signs:  /79  Pulse 85  Temp 97 °F (36.1 °C) (Temporal Artery )   Resp 18  Ht 64\" (162.6 cm)  Wt 123 lb 7.3 oz (56 kg)  SpO2 95%  BMI 21.19 kg/m2    Physical Exam:    CV:  S1S2 regular rate and rhythm, no murmur               Resp:  Clear to auscultation; respirations regular, even and unlabored    Results Review:    I reviewed the patient's new clinical results.    Cancer Staging (if applicable)  Cancer Patient: __ yes _x_no __unknown; If yes, clinical stage T:__ N:__M:__, stage group or __N/A    Preeti Call, APRN  9/1/2017   9:20 AM      "

## 2017-09-02 VITALS
HEART RATE: 76 BPM | BODY MASS INDEX: 21.08 KG/M2 | TEMPERATURE: 97.5 F | RESPIRATION RATE: 16 BRPM | SYSTOLIC BLOOD PRESSURE: 112 MMHG | OXYGEN SATURATION: 97 % | DIASTOLIC BLOOD PRESSURE: 63 MMHG | HEIGHT: 64 IN | WEIGHT: 123.46 LBS

## 2017-09-02 LAB
HCT VFR BLD AUTO: 38.3 % (ref 34.5–44)
HGB BLD-MCNC: 12.5 G/DL (ref 11.5–15.5)

## 2017-09-02 PROCEDURE — A9270 NON-COVERED ITEM OR SERVICE: HCPCS | Performed by: NEUROLOGICAL SURGERY

## 2017-09-02 PROCEDURE — G8978 MOBILITY CURRENT STATUS: HCPCS

## 2017-09-02 PROCEDURE — G8980 MOBILITY D/C STATUS: HCPCS

## 2017-09-02 PROCEDURE — 85018 HEMOGLOBIN: CPT | Performed by: NEUROLOGICAL SURGERY

## 2017-09-02 PROCEDURE — G8987 SELF CARE CURRENT STATUS: HCPCS | Performed by: OCCUPATIONAL THERAPIST

## 2017-09-02 PROCEDURE — 63710000001 FAMOTIDINE 20 MG TABLET: Performed by: NEUROLOGICAL SURGERY

## 2017-09-02 PROCEDURE — 99024 POSTOP FOLLOW-UP VISIT: CPT | Performed by: NEUROLOGICAL SURGERY

## 2017-09-02 PROCEDURE — 25010000002 HYDROMORPHONE PER 4 MG: Performed by: NEUROLOGICAL SURGERY

## 2017-09-02 PROCEDURE — G8979 MOBILITY GOAL STATUS: HCPCS

## 2017-09-02 PROCEDURE — 63710000001 IBUPROFEN 600 MG TABLET: Performed by: NEUROLOGICAL SURGERY

## 2017-09-02 PROCEDURE — G8989 SELF CARE D/C STATUS: HCPCS | Performed by: OCCUPATIONAL THERAPIST

## 2017-09-02 PROCEDURE — 25010000003 CEFAZOLIN IN DEXTROSE 2-4 GM/100ML-% SOLUTION: Performed by: NEUROLOGICAL SURGERY

## 2017-09-02 PROCEDURE — G8988 SELF CARE GOAL STATUS: HCPCS | Performed by: OCCUPATIONAL THERAPIST

## 2017-09-02 PROCEDURE — 63710000001 GABAPENTIN 300 MG CAPSULE: Performed by: NEUROLOGICAL SURGERY

## 2017-09-02 PROCEDURE — 85014 HEMATOCRIT: CPT | Performed by: NEUROLOGICAL SURGERY

## 2017-09-02 PROCEDURE — 63710000001 ATORVASTATIN 10 MG TABLET: Performed by: NEUROLOGICAL SURGERY

## 2017-09-02 PROCEDURE — 97162 PT EVAL MOD COMPLEX 30 MIN: CPT

## 2017-09-02 PROCEDURE — 97110 THERAPEUTIC EXERCISES: CPT

## 2017-09-02 PROCEDURE — 63710000001 LEVOTHYROXINE 50 MCG TABLET: Performed by: NEUROLOGICAL SURGERY

## 2017-09-02 PROCEDURE — 63710000001 OXYCODONE 15 MG TABLET: Performed by: NEUROLOGICAL SURGERY

## 2017-09-02 RX ADMIN — HYDROMORPHONE HYDROCHLORIDE 1 MG: 1 INJECTION, SOLUTION INTRAMUSCULAR; INTRAVENOUS; SUBCUTANEOUS at 02:31

## 2017-09-02 RX ADMIN — OXYCODONE HYDROCHLORIDE 15 MG: 15 TABLET ORAL at 08:54

## 2017-09-02 RX ADMIN — LEVOTHYROXINE SODIUM 50 MCG: 50 TABLET ORAL at 05:08

## 2017-09-02 RX ADMIN — SODIUM CHLORIDE, POTASSIUM CHLORIDE, SODIUM LACTATE AND CALCIUM CHLORIDE 100 ML/HR: 600; 310; 30; 20 INJECTION, SOLUTION INTRAVENOUS at 02:20

## 2017-09-02 RX ADMIN — IBUPROFEN 600 MG: 600 TABLET, FILM COATED ORAL at 02:31

## 2017-09-02 RX ADMIN — ATORVASTATIN CALCIUM 10 MG: 10 TABLET, FILM COATED ORAL at 08:54

## 2017-09-02 RX ADMIN — GABAPENTIN 300 MG: 300 CAPSULE ORAL at 05:08

## 2017-09-02 RX ADMIN — FAMOTIDINE 20 MG: 20 TABLET ORAL at 08:54

## 2017-09-02 RX ADMIN — CEFAZOLIN SODIUM 2 G: 2 INJECTION, SOLUTION INTRAVENOUS at 05:08

## 2017-09-02 NOTE — DISCHARGE SUMMARY
DISCHARGE SUMMARY    Date of Admission: 9/1/2017    Date of Discharge:  9/2/2017    Discharge Diagnosis: Lumbar stenosis L4-5    Procedures Performed:  Procedure(s):  LUMBAR LAMINECTOMY  L4-5    Referring Physician: Ranjan Felix M.D.    Presenting Problem/History of Present Illness  Lumbar stenosis with neurogenic claudication [M48.06]  Herniation of lumbar intervertebral disc [M51.26]     Hospital Course  Patient is a 66 y.o. female who presented with pain in the back radiating to both hips and legs which occurred particularly with standing and walking.  She has received pain management treatment for back pain for about 8 years, currently using fentanyl 75 µg per hour patch supplemented by oxycodone 15 mg immediate release 4 times a day as needed.  Lumbar MRI scan showed a moderately severe stenosis at L4 5 as the apparent cause of neurogenic claudication, without evidence of spondylolisthesis.    On the day of admission a minimal access lumbar laminectomy at L4-5 was performed.  There were no surgical complications.  She complained of significant pain overnight in her back on the left side and although she felt improved pain in her right leg she complained of pain in her left leg.  It was difficult to get control of her pain with usual medications because of the chronic high-dose narcotic use preoperatively.  Nevertheless she resumed ambulation and normal bladder function.  There were no wound complications.  She was discharged home the morning following surgery, to continue her usual preoperative medications.  Follow-up will be scheduled in 4 weeks.    Discharge Medications   Glenda Brown   Home Medication Instructions DI:[id number]    Printed on:09/02/17 0742   Medication Information                      fentaNYL (DURAGESIC) 75 MCG/HR patch  Place 1 patch on the skin Every 72 (Seventy-Two) Hours.             gabapentin (NEURONTIN) 400 MG capsule  Take 300 mg by mouth 3 (Three) Times a Day.              ibuprofen (ADVIL,MOTRIN) 600 MG tablet  Take 600 mg by mouth Every 6 (Six) Hours As Needed for Mild Pain (1-3).             levothyroxine (SYNTHROID, LEVOTHROID) 50 MCG tablet  Take 50 mcg by mouth Daily.             OXYCODONE HCL PO  Take 15 mg by mouth 4 (Four) Times a Day As Needed (PAIN).             pravastatin (PRAVACHOL) 40 MG tablet  Take 40 mg by mouth Daily.             promethazine (PHENERGAN) 25 MG tablet  Take 25 mg by mouth Daily As Needed for Nausea or Vomiting.             raNITIdine (ZANTAC) 150 MG tablet  Take 150 mg by mouth As Needed for Heartburn.                 Anders Aponte MD  09/02/17  7:42 AM

## 2017-09-02 NOTE — PLAN OF CARE
Problem: Patient Care Overview (Adult)  Goal: Plan of Care Review  Outcome: Outcome(s) achieved Date Met:  09/02/17 09/02/17 0953   Coping/Psychosocial Response Interventions   Plan Of Care Reviewed With patient   Outcome Evaluation   Outcome Summary/Follow up Plan Pt s/p lumbar lami and now with improved symptoms of radiating pain; demo's good safety and balance with transfers/fxnl mobility and appropriate teach back with ADL retraining/AE management. Expectecd to d/c home this date with family assist         Problem: Inpatient Occupational Therapy  Goal: Patient Education Goal LTG- OT  Outcome: Outcome(s) achieved Date Met:  09/02/17 09/02/17 0953   Patient Education OT LTG   Patient Education OT LTG, Time to Achieve 2 - 3 days   Patient Education OT LTG, Education Type precautions per surgeon;posture/body mechanics;home safety;positioning;adaptive equipment mgmt   Patient Education OT LTG, Education Understanding demonstrates adequately;verbalizes understanding   Patient Education OT LTG Outcome goal met

## 2017-09-02 NOTE — THERAPY DISCHARGE NOTE
"Acute Care - Physical Therapy Initial Eval/Discharge  [facility]     Patient Name: Glenda Brown  : 1950  MRN: [id number]  Today's Date: 2017   Onset of Illness/Injury or Date of Surgery Date: 17  Date of Referral to PT: 17  Referring Physician: MD lia      Admit Date: 2017    Visit Dx:    ICD-10-CM ICD-9-CM   1. Impaired mobility and ADLs Z74.09 799.89   2. Spinal stenosis, lumbar region, with neurogenic claudication M48.06 724.03   3. Impaired functional mobility, balance, gait, and endurance Z74.09 V49.89     Patient Active Problem List   Diagnosis   • Spinal stenosis, lumbar region, with neurogenic claudication   • Lumbar stenosis with neurogenic claudication   • Herniation of lumbar intervertebral disc     Past Medical History:   Diagnosis Date   • GERD (gastroesophageal reflux disease)    • Headache    • Low back pain    • 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    • Other intervertebral disc degeneration, lumbar region    • Other spondylosis with radiculopathy, lumbar region    • Radiculopathy of lumbar region    • Spondylosis without myelopathy or radiculopathy, lumbar region    • Thyroid disease      Past Surgical History:   Procedure Laterality Date   • COLON RESECTION     • COLONOSCOPY      5 YEARS AGO    • FOREARM SURGERY Right      \"PINCHED NERVES\"    • HYSTERECTOMY            PT ASSESSMENT (last 72 hours)      PT Evaluation       17 0953 17 0905    Rehab Evaluation    Document Type evaluation;therapy note (daily note);discharge summary  -ST evaluation;discharge summary  -KM    Subjective Information no complaints;agree to therapy  -ST no complaints  -KM    Patient Effort, Rehab Treatment excellent  -ST excellent  -KM    Symptoms Noted During/After Treatment none  -ST none  -KM    General Information    Patient Profile Review yes  -ST yes  -KM    Onset of Illness/Injury or Date of Surgery Date 17  -ST 17  -KM "    Referring Physician MD lia  - Dr Aponte  -KM    General Observations pt standing in room with daughter and PT present  -ST     Pertinent History Of Current Problem Pt presents to sx management of long-standing back pain that radiates into bilateral hips, made worse with walking and standing; lumbar stenosis L4-5 with neurogenic claudication; s/p lumbar lami L4-5  -ST Pt standing in room packing bags  -KM    Precautions/Limitations fall precautions;spinal precautions  -ST fall precautions  -KM    Prior Level of Function independent:;all household mobility;bed mobility;feeding;grooming;min assist:;community mobility;dressing;bathing   pain/difficulty w/LBD/LBB and long distance walking  -ST independent:;gait;transfer;bed mobility;ADL's  -KM    Equipment Currently Used at Home none  -ST none  -KM    Plans/Goals Discussed With patient and family;agreed upon  -ST patient and family;agreed upon  -KM    Risks Reviewed patient and family:;LOB;nausea/vomiting;dizziness;increased discomfort;change in vital signs  -ST patient and family:;LOB  -KM    Benefits Reviewed patient and family:;improve function;increase independence;increase strength;increase balance;decrease pain;increase knowledge  -ST patient and family:;improve function  -KM    Barriers to Rehab previous functional deficit  -ST none identified  -KM    Living Environment    Lives With alone  -ST alone   daughter plans to stay with her post d/c  -KM    Living Arrangements house  -ST house  -KM    Home Accessibility tub/shower is not walk in  -ST no concerns  -KM    Living Environment Comment daughter to stay with pt until able to return home independently   -ST     Clinical Impression    Date of Referral to PT  09/01/17  -KM    PT Diagnosis  decreased knowledge post op surgery re precautions   -KM    Patient/Family Goals Statement  return home at OF  -KM    Criteria for Skilled Therapeutic Interventions Met  yes  -KM    Rehab Potential  good, to achieve  stated therapy goals  -KM    Pain Assessment    Pain Assessment 0-10  -ST 0-10  -KM    Pain Score 5  -ST 5  -KM    Post Pain Score 6  -ST 5  -KM    Pain Type Acute pain  -ST Surgical pain  -KM    Pain Location Back   radiates into B hips  -ST Back  -KM    Pain Intervention(s) Repositioned;Ambulation/increased activity  -ST Repositioned;Ambulation/increased activity  -KM    Vision Assessment/Intervention    Visual Impairment WNL  -ST     Cognitive Assessment/Intervention    Current Cognitive/Communication Assessment functional  -ST functional  -KM    Orientation Status oriented x 4  -ST oriented x 4  -KM    Follows Commands/Answers Questions 100% of the time  -ST able to follow multi-step instructions;100% of the time  -KM    Personal Safety WNL/WFL  -ST WNL/WFL  -KM    Personal Safety Interventions fall prevention program maintained;gait belt;nonskid shoes/slippers when out of bed  -ST fall prevention program maintained  -KM    ROM (Range of Motion)    General ROM no range of motion deficits identified  -ST no range of motion deficits identified  -KM    MMT (Manual Muscle Testing)    General MMT Assessment  no strength deficits identified  -KM    General MMT Assessment Detail fxnl for ADL performance, not formally tested d/t spinal precautions   -ST     Bed Mobility, Assessment/Treatment    Bed Mobility, Comment standing upon arrival with PT and daughter; education provided on log roll technique for safety   -ST standing upon arrival  -KM    Transfer Assessment/Treatment    Transfers, Sit-Stand [place] supervision required  -ST independent  -KM    Transfers, Stand-Sit [place] supervision required  - independent  -KM    Bathtub Transfer, [place] supervision required  -ST     Bathtub Transfer, Assistive Device --   instructed to have family present when completing at home   -ST     Transfer, Comment completed safe and appropriate tub transfer s/p education and demo from OT  -ST     Gait  Assessment/Treatment    Gait, [place] Level  conditional independence   supervised by PT  -KM    Gait, Distance (Feet)  150  -KM    Gait, Gait Deviations  antalgic;pat decreased  -KM    Therapy Exercises    Exercise Protocols  --   lami protocol HEP and back book taught  -    Sensory Assessment/Intervention    Light Touch LUE;RUE  -ST     LUE Light Touch WNL  -ST     RUE Light Touch WNL  -ST     Positioning and Restraints    Pre-Treatment Position standing in room  -ST standing in room  -KM    Post Treatment Position bed  -ST bed  -KM    In Bed notified nsg;supine;call light within reach;encouraged to call for assist;with family/caregiver  -ST sitting EOB;call light within reach;encouraged to call for assist;with other staff;with family/caregiver  -KM      09/02/17 0800 09/01/17 2022    Muscle Tone Assessment    Muscle Tone Assessment  Bilateral Upper Extremities;Bilateral Lower Extremities  -EL    Bilateral Upper Extremities Muscle Tone Assessment associated movements noted  -LS associated movements noted  -EL    Bilateral Lower Extremities Muscle Tone Assessment mildly decreased tone  -LS severely decreased tone  -EL      09/01/17 0838       General Information    Equipment Currently Used at Home none  -KMA     Living Environment    Lives With alone  -KMA     Living Arrangements house  -KMA     Home Accessibility stairs to enter home;bed and bath on same level;no concerns  -KMA     Number of Stairs to Enter Home 2  -KMA     Stair Railings at Home none  -KMA     Type of Financial/Environmental Concern none  -KMA     Transportation Available family or friend will provide  -KMA       User Key  (r) = Recorded By, (t) = Taken By, (c) = Cosigned By    Initials Name Provider Type     Jihan Paige OTR Occupational Therapist    JACKSON Heard, SABINA Physical Therapist    VICTOR MANUEL Houser, RN Registered Nurse    LIBRADO Weber, RN Registered Nurse    DEANDRE Bermudez, RN Registered Nurse           Physical Therapy Education     Title: PT OT SLP Therapies (Done)     Topic: Physical Therapy (Done)     Point: Mobility training (Done)    Learning Progress Summary    Learner Readiness Method Response Comment Documented by Status   Patient Acceptance E,TB,H VU,DU Instructed in precautions, log rolling and HEP. Pt taught back precautions  09/02/17 1056 Done   Family Acceptance E,TB,H VU,DU Instructed in precautions, log rolling and HEP. Pt taught back precautions  09/02/17 1056 Done               Point: Home exercise program (Done)    Learning Progress Summary    Learner Readiness Method Response Comment Documented by Status   Patient Acceptance E,TB,H VU,DU Instructed in precautions, log rolling and HEP. Pt taught back precautions  09/02/17 1056 Done   Family Acceptance E,TB,H VU,DU Instructed in precautions, log rolling and HEP. Pt taught back precautions  09/02/17 1056 Done               Point: Body mechanics (Done)    Learning Progress Summary    Learner Readiness Method Response Comment Documented by Status   Patient Acceptance E,TB,H VU,DU Instructed in precautions, log rolling and HEP. Pt taught back precautions  09/02/17 1056 Done   Family Acceptance E,TB,H VU,DU Instructed in precautions, log rolling and HEP. Pt taught back precautions  09/02/17 1056 Done               Point: Precautions (Done)    Learning Progress Summary    Learner Readiness Method Response Comment Documented by Status   Patient Acceptance E,TB,H VU,DU Instructed in precautions, log rolling and HEP. Pt taught back precautions  09/02/17 1056 Done   Family Acceptance E,TB,H VU,DU Instructed in precautions, log rolling and HEP. Pt taught back precautions  09/02/17 1056 Done                      User Key     Initials Effective Dates Name Provider Type Discipline     06/19/15 -  Ghazala Heard, PT Physical Therapist PT                PT Recommendation and Plan  Anticipated Discharge Disposition: home with  assist  PT Frequency: evaluation only  Plan of Care Review  Plan Of Care Reviewed With: patient  Outcome Summary/Follow up Plan: Pt is mobilizing independently and is knowledgable re HEP and movement precautions. Discharging home today with HEP          IP PT Goals       09/02/17 1057          Patient Education PT STG    Patient Education PT STG, Date Established 09/02/17  -KM      Patient Education PT STG, Time to Achieve 1 day  -KM      Patient Education PT STG, Education Type written program;HEP;precaution per surgeon;posture/body mechanics  -KM      Patient Education PT STG, Education Understanding demonstrate adequately  -KM      Patient Education PT STG Outcome goal met  -KM        User Key  (r) = Recorded By, (t) = Taken By, (c) = Cosigned By    Initials Name Provider Type    JACKSON Heard, PT Physical Therapist                Outcome Measures       09/02/17 0953 09/02/17 0905       How much help from another person do you currently need...    Turning from your back to your side while in flat bed without using bedrails?  4  -KM     Moving from lying on back to sitting on the side of a flat bed without bedrails?  4  -KM     Moving to and from a bed to a chair (including a wheelchair)?  4  -KM     Standing up from a chair using your arms (e.g., wheelchair, bedside chair)?  4  -KM     Climbing 3-5 steps with a railing?  3  -KM     To walk in hospital room?  4  -KM     AM-PAC 6 Clicks Score  23  -KM     How much help from another is currently needed...    Putting on and taking off regular lower body clothing? 3  -ST      Bathing (including washing, rinsing, and drying) 3  -ST      Toileting (which includes using toilet bed pan or urinal) 3  -ST      Putting on and taking off regular upper body clothing 4  -ST      Taking care of personal grooming (such as brushing teeth) 4  -ST      Eating meals 4  -ST      Score 21  -ST      Functional Assessment    Outcome Measure Options AM-PAC 6 Clicks Daily  Activity (OT)  -ST AM-PAC 6 Clicks Basic Mobility (PT)  -KM       User Key  (r) = Recorded By, (t) = Taken By, (c) = Cosigned By    Initials Name Provider Type    ST Jihna Paige, OTR Occupational Therapist    KM Ghazala Heard, PT Physical Therapist           Time Calculation:         PT Charges       09/02/17 1100          Time Calculation    Start Time 0905  -KM      PT Received On 09/02/17  -KM      PT Goal Re-Cert Due Date 09/12/17  -KM      Time Calculation- PT    Total Timed Code Minutes- PT 12 minute(s)  -KM        User Key  (r) = Recorded By, (t) = Taken By, (c) = Cosigned By    Initials Name Provider Type    JACKSON Heard, PT Physical Therapist          Therapy Charges for Today     Code Description Service Date Service Provider Modifiers Qty    60623856715 HC PT MOBILITY CURRENT 9/2/2017 Ghazala Heard, PT GP, CI 1    41523178588 HC PT MOBILITY PROJECTED 9/2/2017 Ghazala Heard, PT GP, CI 1    89516087392 HC PT MOBILITY DISCHARGE 9/2/2017 Ghazala Heard, PT GP, CI 1    57245427371 HC PT EVAL MOD COMPLEXITY 3 9/2/2017 Ghazala Heard, PT GP 1    16990042366 HC PT THER PROC EA 15 MIN 9/2/2017 Ghazala Heard, PT GP 1          PT G-Codes  Outcome Measure Options: AM-PAC 6 Clicks Basic Mobility (PT)  Score: 23  Functional Limitation: Mobility: Walking and moving around  Mobility: Walking and Moving Around Current Status (): At least 1 percent but less than 20 percent impaired, limited or restricted  Mobility: Walking and Moving Around Goal Status (): At least 1 percent but less than 20 percent impaired, limited or restricted  Mobility: Walking and Moving Around Discharge Status (): At least 1 percent but less than 20 percent impaired, limited or restricted    PT Discharge Summary  Anticipated Discharge Disposition: home with assist  Reason for Discharge: All goals achieved  Outcomes Achieved: Able to achieve all goals within established  timeline  Discharge Destination: Home with assist    Ghazala Heard, PT  9/2/2017

## 2017-09-02 NOTE — THERAPY DISCHARGE NOTE
"Acute Care - Occupational Therapy Initial Eval/Discharge  [facility]     Patient Name: Glenda Brown  : 1950  MRN: [id number]  Today's Date: 2017  Onset of Illness/Injury or Date of Surgery Date: 17  Date of Referral to OT: 17  Referring Physician: MD lia      Admit Date: 2017       ICD-10-CM ICD-9-CM   1. Impaired mobility and ADLs Z74.09 799.89   2. Spinal stenosis, lumbar region, with neurogenic claudication M48.06 724.03     Patient Active Problem List   Diagnosis   • Spinal stenosis, lumbar region, with neurogenic claudication   • Lumbar stenosis with neurogenic claudication   • Herniation of lumbar intervertebral disc     Past Medical History:   Diagnosis Date   • GERD (gastroesophageal reflux disease)    • Headache    • Low back pain    • 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    • Other intervertebral disc degeneration, lumbar region    • Other spondylosis with radiculopathy, lumbar region    • Radiculopathy of lumbar region    • Spondylosis without myelopathy or radiculopathy, lumbar region    • Thyroid disease      Past Surgical History:   Procedure Laterality Date   • COLON RESECTION     • COLONOSCOPY      5 YEARS AGO    • FOREARM SURGERY Right      \"PINCHED NERVES\"    • HYSTERECTOMY            OT ASSESSMENT FLOWSHEET (last 72 hours)      OT Evaluation       17 0953 17 0800 17 0838       Rehab Evaluation    Document Type evaluation;therapy note (daily note);discharge summary  -ST        Subjective Information no complaints;agree to therapy  -ST        Patient Effort, Rehab Treatment excellent  -ST        Symptoms Noted During/After Treatment none  -ST        General Information    Patient Profile Review yes  -ST        Onset of Illness/Injury or Date of Surgery Date 17  -ST        Referring Physician MD lia  -ST        General Observations pt standing in room with daughter and PT present  -ST    "     Pertinent History Of Current Problem Pt presents to sx management of long-standing back pain that radiates into bilateral hips, made worse with walking and standing; lumbar stenosis L4-5 with neurogenic claudication; s/p lumbar lami L4-5  -ST        Precautions/Limitations fall precautions;spinal precautions  -ST        Prior Level of Function independent:;all household mobility;bed mobility;feeding;grooming;min assist:;community mobility;dressing;bathing   pain/difficulty w/LBD/LBB and long distance walking  -ST        Equipment Currently Used at Home none  -ST   none  -KM     Plans/Goals Discussed With patient and family;agreed upon  -ST        Risks Reviewed patient and family:;LOB;nausea/vomiting;dizziness;increased discomfort;change in vital signs  -ST        Benefits Reviewed patient and family:;improve function;increase independence;increase strength;increase balance;decrease pain;increase knowledge  -ST        Barriers to Rehab previous functional deficit  -ST        Living Environment    Lives With alone  -ST   alone  -KM     Living Arrangements house  -ST   house  -KM     Home Accessibility tub/shower is not walk in  -ST   stairs to enter home;bed and bath on same level;no concerns  -KM     Number of Stairs to Enter Home    2  -KM     Stair Railings at Home    none  -KM     Type of Financial/Environmental Concern    none  -KM     Transportation Available    family or friend will provide  -KM     Living Environment Comment daughter to stay with pt until able to return home independently   -ST        Clinical Impression    Date of Referral to OT 09/02/17  -ST        OT Diagnosis impaired ADLs   -ST        Prognosis good  -ST        Patient/Family Goals Statement return home/dec. pain  -ST        Criteria for Skilled Therapeutic Interventions Met yes  -ST        Therapy Frequency evaluation only  -ST        Anticipated Equipment Needs At Discharge raised toilet seat   grab bars, reacher/sock-aide  -ST         Anticipated Discharge Disposition home with assist  -ST        Functional Level Prior    Ambulation    0-->independent  -KM     Transferring    0-->independent  -KM     Toileting    0-->independent  -KM     Bathing    0-->independent  -KM     Dressing    0-->independent  -KM     Eating    0-->independent  -KM     Communication    0-->understands/communicates without difficulty  -KM     Swallowing    0-->swallows foods/liquids without difficulty  -KM     Pain Assessment    Pain Assessment 0-10  -ST        Pain Score 5  -ST        Post Pain Score 6  -ST        Pain Type Acute pain  -ST        Pain Location Back   radiates into B hips  -ST        Pain Intervention(s) Repositioned;Ambulation/increased activity  -ST        Vision Assessment/Intervention    Visual Impairment WNL  -ST        Cognitive Assessment/Intervention    Current Cognitive/Communication Assessment functional  -ST        Orientation Status oriented x 4  -ST        Follows Commands/Answers Questions 100% of the time  -ST        Personal Safety WNL/WFL  -ST        Personal Safety Interventions fall prevention program maintained;gait belt;nonskid shoes/slippers when out of bed  -ST        ROM (Range of Motion)    General ROM no range of motion deficits identified  -ST        MMT (Manual Muscle Testing)    General MMT Assessment Detail fxnl for ADL performance, not formally tested d/t spinal precautions   -ST        Muscle Tone Assessment    Muscle Tone Assessment   Bilateral Upper Extremities;Bilateral Lower Extremities  -EL      Bilateral Upper Extremities Muscle Tone Assessment  associated movements noted  -LS associated movements noted  -EL      Bilateral Lower Extremities Muscle Tone Assessment  mildly decreased tone  -LS severely decreased tone  -EL      Bed Mobility, Assessment/Treatment    Bed Mobility, Comment standing upon arrival with PT and daughter; education provided on log roll technique for safety   -ST        Transfer  Assessment/Treatment    Transfers, Sit-Stand [place] supervision required  -ST        Transfers, Stand-Sit [place] supervision required  -        Bathtub Transfer, [place] supervision required  -        Bathtub Transfer, Assistive Device --   instructed to have family present when completing at home   -ST        Transfer, Comment completed safe and appropriate tub transfer s/p education and demo from OT  -        Functional Mobility    Functional Mobility- Ind. Level supervision required  -        Functional Mobility-Distance (Feet) 100  -ST        Functional Mobility- Comment demo's good safety and balance in room and in hallway  -        Upper Body Dressing Assessment/Training    UB Dressing Assess/Train, Comment demo's appropriate ROM to complete   -        Lower Body Dressing Assessment/Training    LB Dressing Assess/Train, Clothing Type doffing:;donning:;pants;slipper socks  -ST        LB Dressing Assess/Train, Assist Device reacher;sock-aid  -        LB Dressing Assess/Train, Position sitting  -ST        LB Dressing Assess/Train, [place] conditional independence  -        LB Dressing Assess/Train, Impairments --   spinal precautions   -ST        Toileting Assessment/Training    Toileting Assess/Train, Comment completed education on toileting hygiene and clothing management while adhering to spinal precautions   -ST        Sensory Assessment/Intervention    Light Touch LUE;RUE  -ST        LUE Light Touch WNL  -ST        RUE Light Touch WNL  -ST        General Therapy Interventions    Adaptive Equipment Training completed education on AE training to promote safety and independence with LBD   -ST        ADL Retraining completed ADL re-training for safety with adhering to spinal precautions   -ST        Positioning and Restraints    Pre-Treatment Position standing in room  -ST        Post Treatment Position bed  -ST        In Bed notified nsg;supine;call light within  reach;encouraged to call for assist;with family/caregiver  -ST          User Key  (r) = Recorded By, (t) = Taken By, (c) = Cosigned By    Initials Name Effective Dates    ST Jihan Paige, OTR 02/20/17 -     JACKSON Houser, KATHY 06/16/16 -     LIBRADO Weber, RN 02/02/17 -     DEANDRE Bermudez, KATHY 06/26/17 -           Occupational Therapy Education     Title: PT OT SLP Therapies (Done)     Topic: Occupational Therapy (Done)     Point: ADL training (Done)    Description: Instruct learner(s) on proper safety adaptation and remediation techniques during self care or transfers.   Instruct in proper use of assistive devices.    Learning Progress Summary    Learner Readiness Method Response Comment Documented by Status   Patient Acceptance E,D,H,TB VU,DU spinal precautions, log roll, AE/LBD training, home safety, toileting hygiene  09/02/17 1032 Done   Family Acceptance E,D,H,TB VU,DU spinal precautions, log roll, AE/LBD training, home safety, toileting hygiene  09/02/17 1032 Done               Point: Home exercise program (Done)    Description: Instruct learner(s) on appropriate technique for monitoring, assisting and/or progressing therapeutic exercises/activities.    Learning Progress Summary    Learner Readiness Method Response Comment Documented by Status   Patient Acceptance E,D,H,TB VU,DU spinal precautions, log roll, AE/LBD training, home safety, toileting hygiene ST 09/02/17 1032 Done   Family Acceptance E,D,H,TB VU,DU spinal precautions, log roll, AE/LBD training, home safety, toileting hygiene  09/02/17 1032 Done               Point: Precautions (Done)    Description: Instruct learner(s) on prescribed precautions during self-care and functional transfers.    Learning Progress Summary    Learner Readiness Method Response Comment Documented by Status   Patient Acceptance E,D,H,TB VU,DU spinal precautions, log roll, AE/LBD training, home safety, toileting hygiene  09/02/17 1032 Done   Family Acceptance  E,D,H,TB VU,DU spinal precautions, log roll, AE/LBD training, home safety, toileting hygiene  09/02/17 1032 Done               Point: Body mechanics (Done)    Description: Instruct learner(s) on proper positioning and spine alignment during self-care, functional mobility activities and/or exercises.    Learning Progress Summary    Learner Readiness Method Response Comment Documented by Status   Patient Acceptance E,D,H,TB VU,DU spinal precautions, log roll, AE/LBD training, home safety, toileting hygiene  09/02/17 1032 Done   Family Acceptance E,D,H,TB VU,DU spinal precautions, log roll, AE/LBD training, home safety, toileting hygiene  09/02/17 1032 Done                      User Key     Initials Effective Dates Name Provider Type [facility] 02/20/17 -  Jihan Paige OTR Occupational Therapist OT                OT Recommendation and Plan  Anticipated Equipment Needs At Discharge: raised toilet seat (grab bars, reacher/sock-aide)  Anticipated Discharge Disposition: home with assist  Therapy Frequency: evaluation only  Plan of Care Review  Plan Of Care Reviewed With: patient  Outcome Summary/Follow up Plan: Pt s/p lumbar lami and now with improved symptoms of radiating pain; demo's good safety and balance with transfers/fxnl mobility and appropriate teach back with ADL retraining/AE management. Expectecd to d/c home this date with family assist           OT Goals       09/02/17 0953          Patient Education OT LTG    Patient Education OT LTG, Time to Achieve 2 - 3 days  -      Patient Education OT LTG, Education Type precautions per surgeon;posture/body mechanics;home safety;positioning;adaptive equipment mgmt  -      Patient Education OT LTG, Education Understanding demonstrates adequately;verbalizes understanding  -      Patient Education OT LTG Outcome goal met  -        User Key  (r) = Recorded By, (t) = Taken By, (c) = Cosigned By    Initials Name Provider Type     Jihan Paige,  OTR Occupational Therapist                Outcome Measures       09/02/17 0953          How much help from another is currently needed...    Putting on and taking off regular lower body clothing? 3  -ST      Bathing (including washing, rinsing, and drying) 3  -ST      Toileting (which includes using toilet bed pan or urinal) 3  -ST      Putting on and taking off regular upper body clothing 4  -ST      Taking care of personal grooming (such as brushing teeth) 4  -ST      Eating meals 4  -ST      Score 21  -ST      Functional Assessment    Outcome Measure Options AM-PAC 6 Clicks Daily Activity (OT)  -ST        User Key  (r) = Recorded By, (t) = Taken By, (c) = Cosigned By    Initials Name Provider Type    SHELDON Mann Occupational Therapist          Time Calculation:         Time Calculation- OT       09/02/17 1035          Time Calculation- OT    OT Start Time 0953  -ST      Total Timed Code Minutes- OT 23 minute(s)  -ST      OT Received On 09/02/17  -ST        User Key  (r) = Recorded By, (t) = Taken By, (c) = Cosigned By    Initials Name Provider Type    SHELDON Mann Occupational Therapist          Therapy Charges for Today     Code Description Service Date Service Provider Modifiers Qty    82445815153 HC OT SELFCARE CURRENT 9/2/2017 SHELDON George CJ 1    89362115273 HC OT SELFCARE PROJECTED 9/2/2017 SHELDON George CJ 1    67639754920 HC OT SELFCARE DISCHARGE 9/2/2017 SHELDON George CJ 1          OT G-codes  OT Professional Judgement Used?: Yes  OT Functional Scales Options: AM-PAC 6 Clicks Daily Activity (OT)  Score: 21  Functional Limitation: Self care  Self Care Current Status (): At least 20 percent but less than 40 percent impaired, limited or restricted  Self Care Goal Status (): At least 20 percent but less than 40 percent impaired, limited or restricted  Self Care Discharge Status (): At least 20 percent but less than 40 percent  impaired, limited or restricted    OT Discharge Summary  Anticipated Discharge Disposition: home with assist    Jihan Paige, OTR  9/2/2017

## 2017-09-02 NOTE — PLAN OF CARE
Problem: Patient Care Overview (Adult)  Goal: Plan of Care Review  Outcome: Outcome(s) achieved Date Met:  09/02/17 09/02/17 1057   Coping/Psychosocial Response Interventions   Plan Of Care Reviewed With patient   Outcome Evaluation   Outcome Summary/Follow up Plan Pt is mobilizing independently and is knowledgable re HEP and movement precautions. Discharging home today with HEP         Problem: Inpatient Physical Therapy  Goal: Patient Education Goal STG- PT  Outcome: Outcome(s) achieved Date Met:  09/02/17 09/02/17 1057   Patient Education PT STG   Patient Education PT STG, Date Established 09/02/17   Patient Education PT STG, Time to Achieve 1 day   Patient Education PT STG, Education Type written program;HEP;precaution per surgeon;posture/body mechanics   Patient Education PT STG, Education Understanding demonstrate adequately   Patient Education PT STG Outcome goal met

## 2017-09-02 NOTE — PLAN OF CARE
Problem: Patient Care Overview (Adult)  Goal: Plan of Care Review  Outcome: Ongoing (interventions implemented as appropriate)    09/02/17 0352   Coping/Psychosocial Response Interventions   Plan Of Care Reviewed With patient   Patient Care Overview   Progress progress toward functional goals as expected   Outcome Evaluation   Outcome Summary/Follow up Plan pt having issues with pain. PRNs given. pt ambulated in [place], voiding well, vitals WNL. no other issues noted         Problem: Perioperative Period (Adult)  Goal: Signs and Symptoms of Listed Potential Problems Will be Absent or Manageable (Perioperative Period)  Outcome: Ongoing (interventions implemented as appropriate)    09/02/17 0352   Perioperative Period   Problems Assessed (Perioperative Period) all   Problems Present (Perioperative Period) pain         Problem: Knee Replacement, Total (Adult)  Goal: Signs and Symptoms of Listed Potential Problems Will be Absent or Manageable (Knee Replacement, Total)  Outcome: Ongoing (interventions implemented as appropriate)    09/02/17 0352   Knee Replacement, Total   Problems Assessed (Total Knee Replacement) all   Problems Present (Total Knee Replacement) pain         Problem: Fall Risk (Adult)  Goal: Identify Related Risk Factors and Signs and Symptoms  Outcome: Ongoing (interventions implemented as appropriate)    09/02/17 0352   Fall Risk   Fall Risk: Related Risk Factors gait/mobility problems;history of falls   Fall Risk: Signs and Symptoms presence of risk factors       Goal: Absence of Falls  Outcome: Ongoing (interventions implemented as appropriate)    09/02/17 0352   Fall Risk (Adult)   Absence of Falls making progress toward outcome

## 2017-09-05 ENCOUNTER — TELEPHONE (OUTPATIENT)
Dept: NEUROSURGERY | Facility: CLINIC | Age: 67
End: 2017-09-05

## 2017-09-05 NOTE — TELEPHONE ENCOUNTER
Per Dr. Aponte's discharge summary on 09/02/17:  She complained of significant pain overnight in her back on the left side and although she felt improved pain in her right leg she complained of pain in her left leg.  It was difficult to get control of her pain with usual medications because of the chronic high-dose narcotic use preoperatively.    Please review the following with the patient and reassure her that her symptoms are normal this soon after surgery:  NERVE HEALING  The onset of nerve recovery will start immediately after the nerve is decompressed. However, nerves heal slowly, and numbness, tingling or even pain from the nerve irritation can occur in the first few weeks after surgery. Although many patients notice an immediate difference in symptoms after surgery, results can vary. This does not mean that surgery was unsuccessful. Immediately after surgery, pain may improve followed by improvements in numbness and tingling. This can take weeks or months to improve or resolve.     Continue with prescribed narcotic pain medication provided upon discharge.  Apply ice to lumbar are for 15 minute increments, every 2 hours, for additional symptom relief.  Instruct patient to take OTC ibuprofen 800mg one hour after taking narcotic pain medication dose for additional relief.   If no improvement over the next 2-3 days, have the patient contact the office and we can provide a steroid dose pack to help the inflammatory component of the postoperative symptoms.

## 2017-09-05 NOTE — TELEPHONE ENCOUNTER
"Provider:  Fadi  Caller: Glenda  Time of call:   11:13am  Phone #:  [phone]  Surgery:  LUMBAR LAMINECTOMY  L4-5  Surgery Date:  9/1/2017  Last visit:   8/10/2017  Next visit: TBD          Reason for call:         Pt called and left message stating she was having post op problems.      I called pt back and she said when she tries to walk she is having pain in her legs and is having \"electrical shock\" sensations.      When did it start: right after surgery     Where is it located: both legs, left is more painful than the right     How long has this been going on/is this new or the same as before surgery: before sx      Characteristics of symptom/severity: 8/10    Timing- Is it constant or intermittent: constant     What makes it worse: walking     What makes it better: sitting, laying     What therapies/medications have you tried: fentanyl patch, oxycodone (only if it gets really bad)               "

## 2018-11-26 ENCOUNTER — APPOINTMENT (OUTPATIENT)
Dept: WOMENS IMAGING | Facility: HOSPITAL | Age: 68
End: 2018-11-26

## 2018-11-26 PROCEDURE — 77067 SCR MAMMO BI INCL CAD: CPT | Performed by: RADIOLOGY

## 2020-01-23 ENCOUNTER — OFFICE VISIT (OUTPATIENT)
Dept: NEUROSURGERY | Facility: CLINIC | Age: 70
End: 2020-01-23

## 2020-01-23 VITALS
DIASTOLIC BLOOD PRESSURE: 64 MMHG | WEIGHT: 116 LBS | HEIGHT: 64 IN | HEART RATE: 66 BPM | TEMPERATURE: 98 F | BODY MASS INDEX: 19.81 KG/M2 | SYSTOLIC BLOOD PRESSURE: 92 MMHG | OXYGEN SATURATION: 92 % | RESPIRATION RATE: 20 BRPM

## 2020-01-23 DIAGNOSIS — M51.36 DDD (DEGENERATIVE DISC DISEASE), LUMBAR: Primary | ICD-10-CM

## 2020-01-23 PROBLEM — M51.369 DDD (DEGENERATIVE DISC DISEASE), LUMBAR: Status: ACTIVE | Noted: 2020-01-23

## 2020-01-23 PROCEDURE — 99213 OFFICE O/P EST LOW 20 MIN: CPT | Performed by: NEUROLOGICAL SURGERY

## 2020-01-23 RX ORDER — ALENDRONATE SODIUM 70 MG/1
TABLET ORAL
COMMUNITY
Start: 2020-01-13

## 2020-01-23 RX ORDER — CARVEDILOL 12.5 MG/1
TABLET ORAL
COMMUNITY
Start: 2020-01-13 | End: 2021-01-12

## 2020-01-23 RX ORDER — DESVENLAFAXINE SUCCINATE 50 MG/1
50 TABLET, EXTENDED RELEASE ORAL DAILY
COMMUNITY
Start: 2019-11-25 | End: 2023-02-03

## 2020-01-23 NOTE — PROGRESS NOTES
Subjective   Patient ID: Glenda Brown is a 69 y.o. female is being seen for consultation today at the request of EDMUND Solano  Chief Complaint: Back pain with leg pain    History of Present Illness: The patient is a 59-year-old woman from [place] with a history of chronic back pain for the past 4 years.  She is a  and is here today with her daughter.  I did a lumbar discectomy at L4-5 3 years ago which gave her some relief for 3 months and then she had recurrence of pain.  She is now been treated by Dr. Ranjan Felix at the pain treatment center, and currently uses oxycodone 15 mg 4 times daily as well as gabapentin.  Some days she feels good but most days she feels bad.  At times she has difficulty rising from the sitting position such as a chair or commode.  Pain shifts from one side to the other sometimes one leg sometimes the other.    Review of Radiographic Studies:  Lumbar MRI scan was done on 9/4/2019 and reviewed today and it shows degenerative disc change most focused at L4-5 with full decompression of L4-5 on the right.  There is a moderate degenerative disc at L2-3, L3-4, and even and L5-S1, no scoliosis, no major facet disease.    The following portions of the patient's history were reviewed, updated as appropriate and approved: allergies, current medications, past family history, past medical history, past social history, past surgical history, review of systems and problem list.    Review of Systems   Constitutional: Negative for activity change, appetite change, chills, diaphoresis, fatigue, fever and unexpected weight change.   HENT: Negative for congestion, dental problem, drooling, ear discharge, ear pain, facial swelling, hearing loss, mouth sores, nosebleeds, postnasal drip, rhinorrhea, sinus pressure, sneezing, sore throat, tinnitus, trouble swallowing and voice change.    Eyes: Negative for photophobia, pain, discharge, redness, itching and visual disturbance.   Respiratory:  Negative for apnea, cough, choking, chest tightness, shortness of breath, wheezing and stridor.    Cardiovascular: Negative for chest pain, palpitations and leg swelling.   Gastrointestinal: Negative for abdominal distention, abdominal pain, anal bleeding, blood in stool, constipation, diarrhea, nausea, rectal pain and vomiting.   Endocrine: Positive for cold intolerance and polydipsia. Negative for heat intolerance, polyphagia and polyuria.   Genitourinary: Negative for decreased urine volume, difficulty urinating, dysuria, enuresis, flank pain, frequency, genital sores, hematuria and urgency.   Musculoskeletal: Positive for back pain and gait problem. Negative for arthralgias, joint swelling, myalgias, neck pain and neck stiffness.   Skin: Negative for color change, pallor, rash and wound.   Allergic/Immunologic: Negative for environmental allergies, food allergies and immunocompromised state.   Neurological: Positive for weakness and headaches. Negative for dizziness, tremors, seizures, syncope, facial asymmetry, speech difficulty, light-headedness and numbness.   Hematological: Negative for adenopathy. Does not bruise/bleed easily.   Psychiatric/Behavioral: Negative for agitation, behavioral problems, confusion, decreased concentration, dysphoric mood, hallucinations, self-injury, sleep disturbance and suicidal ideas. The patient is not nervous/anxious and is not hyperactive.        Objective     NEUROLOGICAL EXAMINATION:      MENTAL STATUS:  Alert and oriented.  Speech intact.  Recent and remote memory intact.      CRANIAL NERVES:  Cranial nerve II:  Visual fields are full.  Cranial nerves III, IV and VI:  PERRLADC.  Extraocular movements are intact.  Nystagmus is not present.  Cranial nerve V:  Facial sensation is intact.  Cranial nerve VII:  Muscles of facial expression reveal no asymmetry.  Cranial nerve VIII:  Hearing is intact.  Cranial nerves IX and X:  Palate elevates symmetrically.  Cranial nerve XI:   Shoulder shrug is intact.  Cranial nerve XII:  Tongue is midline without evidence of atrophy or fasciculation.    MUSCULOSKELETAL:  SLR negative bilaterally    MOTOR: Intact dorsiflexion and plantar flexion bilaterally    SENSATION: Intact to touch over the lower extremities    REFLEXES:  DTR 1+ both knees and trace both ankles      Assessment   Low back pain syndrome.  No segmental instability or radiculopathy.  Appears depressed.       Plan   There is no surgical option.  Dr. Ranjan Felix is considering pain pump versus spinal cord stimulator and I will leave that to his judgment.  I think an exercise program would be extremely helpful for managing daily routine activities.       Anders Aponte MD

## 2020-02-06 ENCOUNTER — APPOINTMENT (OUTPATIENT)
Dept: WOMENS IMAGING | Facility: HOSPITAL | Age: 70
End: 2020-02-06

## 2020-02-06 PROCEDURE — 77067 SCR MAMMO BI INCL CAD: CPT | Performed by: RADIOLOGY

## 2021-01-12 RX ORDER — CARVEDILOL 12.5 MG/1
12.5 TABLET ORAL 2 TIMES DAILY
Qty: 60 TABLET | Refills: 3 | Status: SHIPPED | OUTPATIENT
Start: 2021-01-12 | End: 2021-05-06 | Stop reason: SDUPTHER

## 2021-01-12 NOTE — TELEPHONE ENCOUNTER
Dr. Mendez:     Last Office Visit: 12/01/2020  Last Labs: 08/13/2020  Next Lab Visit: N/A   Next Office Visit: N/A      :  Please book patient for a follow up appointment for labs and office visit.

## 2021-02-17 ENCOUNTER — OFFICE VISIT (OUTPATIENT)
Dept: CARDIOLOGY | Facility: CLINIC | Age: 71
End: 2021-02-17

## 2021-02-17 VITALS
HEIGHT: 64 IN | SYSTOLIC BLOOD PRESSURE: 102 MMHG | WEIGHT: 124 LBS | BODY MASS INDEX: 21.17 KG/M2 | DIASTOLIC BLOOD PRESSURE: 84 MMHG

## 2021-02-17 DIAGNOSIS — I51.81 STRESS-INDUCED CARDIOMYOPATHY: Primary | ICD-10-CM

## 2021-02-17 DIAGNOSIS — E78.5 HYPERLIPIDEMIA LDL GOAL <100: ICD-10-CM

## 2021-02-17 PROCEDURE — 99443 PR PHYS/QHP TELEPHONE EVALUATION 21-30 MIN: CPT | Performed by: INTERNAL MEDICINE

## 2021-02-17 NOTE — PROGRESS NOTES
"[facility]  [facility] CARDIOLOGY  [address] [place] [address]  Dept: [phone]  Dept Fax: [phone]    Glenda Brown  1950    Televisit Note    You have chosen to receive care through a telephone visit. Do you consent to use a telephone visit for your medical care today? Yes    History of Present Illness:  Glenda Brown is a 70 y.o. female who presents via phone for Follow-up. Stress cardiomyopathy-, she seems doing well, no complaints, no edema,  On Entresto and Coreg.her Ef normalized,     The following portions of the patient's history were reviewed and updated as appropriate: allergies, current medications, past family history, past medical history, past social history, past surgical history and problem list.    This visit was scheduled as a phone visit to comply with patient safety concerns in accordance with CDC recommendations.  Time spent talking to  the patient was 30 minutes.     Medications  acetaminophen  alendronate  aspirin  carvedilol  desvenlafaxine  fentaNYL  gabapentin  ibuprofen  levothyroxine  OXYCODONE HCL PO  pravastatin  promethazine  sacubitril-valsartan    Subjective  Allergies   Allergen Reactions   • Morphine Sulfate Other (See Comments)     \"MY BODY DOESN'T ABSORB IT LIKE IT SHOULD AND I OVERDOSED ON IT\"    • Oxycontin [Oxycodone Hcl] Nausea Only        Past Medical History:   Diagnosis Date   • Acquired hypothyroidism    • Back pain    • Chronic bilateral low back pain with bilateral sciatica    • Coronary artery disease of native artery of native heart with stable angina pectoris (CMS/HCC)    • Depression    • GERD (gastroesophageal reflux disease)    • Headache    • Heart attack (CMS/HCC) 02/2019   • Hyperlipidemia    • Low back pain    • Lumbago with sciatica, right side    • Migraine    • Moderate episode of recurrent major depressive disorder (CMS/HCC)    • 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  " "  • Osteoporosis    • Other chronic pain    • Other intervertebral disc degeneration, lumbar region    • Other spondylosis with radiculopathy, lumbar region    • Persistent insomnia    • Pinched nerve 2007    ELBOW AND ARM   • Pulmonary nodules    • Radiculopathy of lumbar region    • Spondylosis without myelopathy or radiculopathy, lumbar region    • Stress-induced cardiomyopathy    • Thyroid disease    • Tobacco use        Past Surgical History:   Procedure Laterality Date   • COLON RESECTION     • COLONOSCOPY      5 YEARS AGO    • FOREARM SURGERY Right      \"PINCHED NERVES\"    • HYSTERECTOMY  1977   • LAMINECTOMY AND MICRODISCECTOMY LUMBAR SPINE N/A 9/1/2017    Procedure: LUMBAR LAMINECTOMY  L4-5;  Surgeon: Anders Aponte MD;  Location: [facility];  Service:        Family History   Family history unknown: Yes        Social History     Socioeconomic History   • Marital status:      Spouse name: Not on file   • Number of children: Not on file   • Years of education: Not on file   • Highest education level: Not on file   Tobacco Use   • Smoking status: Current Every Day Smoker     Packs/day: 1.00     Years: 53.00     Pack years: 53.00     Types: Cigarettes     Start date: 1966   • Smokeless tobacco: Never Used   Substance and Sexual Activity   • Alcohol use: No   • Drug use: No   • Sexual activity: Defer       Cardiovascular Procedures    ECHO/MUGA:   STRESS TESTS:   CARDIAC CATH:   DEVICES:   HOLTER:   CT/MRI:   VASCULAR:   CARDIOTHORACIC:     Objective  Vitals:    02/17/21 0927   BP: 102/84  Comment: at home   BP Location: Left arm   Patient Position: Sitting   Cuff Size: Adult   Weight: 56.2 kg (124 lb)   Height: 162.6 cm (64\")   PainSc:   4   PainLoc: Leg     Body mass index is 21.28 kg/m².    Assessment and Plan  Diagnoses and all orders for this visit:    Stress-induced cardiomyopathy-Ef normalized, doing good, will keep same meds    Hyperlipidemia LDL goal <100- on Pravastatin         No follow-ups on " file.    Jeramy Mendez MD  02/17/2021

## 2021-03-01 ENCOUNTER — APPOINTMENT (OUTPATIENT)
Dept: WOMENS IMAGING | Facility: HOSPITAL | Age: 71
End: 2021-03-01

## 2021-03-01 PROCEDURE — 77067 SCR MAMMO BI INCL CAD: CPT | Performed by: RADIOLOGY

## 2021-05-06 DIAGNOSIS — I10 ESSENTIAL HYPERTENSION, BENIGN: Primary | ICD-10-CM

## 2021-05-06 RX ORDER — CARVEDILOL 12.5 MG/1
12.5 TABLET ORAL 2 TIMES DAILY
Qty: 180 TABLET | Refills: 3 | Status: SHIPPED | OUTPATIENT
Start: 2021-05-06 | End: 2021-08-17 | Stop reason: SDUPTHER

## 2021-06-15 ENCOUNTER — TELEPHONE (OUTPATIENT)
Dept: CARDIOLOGY | Facility: CLINIC | Age: 71
End: 2021-06-15

## 2021-07-01 DIAGNOSIS — I51.81 STRESS-INDUCED CARDIOMYOPATHY: Primary | ICD-10-CM

## 2021-07-01 RX ORDER — SACUBITRIL AND VALSARTAN 49; 51 MG/1; MG/1
1 TABLET, FILM COATED ORAL 2 TIMES DAILY
Qty: 180 TABLET | Refills: 3 | Status: SHIPPED | OUTPATIENT
Start: 2021-07-01 | End: 2021-08-17 | Stop reason: SDUPTHER

## 2021-08-17 ENCOUNTER — OFFICE VISIT (OUTPATIENT)
Dept: CARDIOLOGY | Facility: CLINIC | Age: 71
End: 2021-08-17

## 2021-08-17 VITALS
BODY MASS INDEX: 19.81 KG/M2 | DIASTOLIC BLOOD PRESSURE: 52 MMHG | RESPIRATION RATE: 15 BRPM | HEART RATE: 72 BPM | WEIGHT: 116 LBS | OXYGEN SATURATION: 95 % | SYSTOLIC BLOOD PRESSURE: 102 MMHG | TEMPERATURE: 97.1 F | HEIGHT: 64 IN

## 2021-08-17 DIAGNOSIS — Z72.0 TOBACCO USE: ICD-10-CM

## 2021-08-17 DIAGNOSIS — E78.5 HYPERLIPIDEMIA LDL GOAL <100: ICD-10-CM

## 2021-08-17 DIAGNOSIS — I51.81 STRESS-INDUCED CARDIOMYOPATHY: ICD-10-CM

## 2021-08-17 DIAGNOSIS — I10 ESSENTIAL HYPERTENSION, BENIGN: Primary | ICD-10-CM

## 2021-08-17 PROCEDURE — 99214 OFFICE O/P EST MOD 30 MIN: CPT | Performed by: INTERNAL MEDICINE

## 2021-08-17 PROCEDURE — 93000 ELECTROCARDIOGRAM COMPLETE: CPT | Performed by: INTERNAL MEDICINE

## 2021-08-17 RX ORDER — PRAVASTATIN SODIUM 40 MG
40 TABLET ORAL DAILY
Qty: 90 TABLET | Refills: 3 | Status: SHIPPED | OUTPATIENT
Start: 2021-08-17 | End: 2021-08-19

## 2021-08-17 RX ORDER — CARVEDILOL 12.5 MG/1
12.5 TABLET ORAL 2 TIMES DAILY
Qty: 180 TABLET | Refills: 3 | Status: SHIPPED | OUTPATIENT
Start: 2021-08-17 | End: 2023-02-03

## 2021-08-17 RX ORDER — SACUBITRIL AND VALSARTAN 49; 51 MG/1; MG/1
1 TABLET, FILM COATED ORAL 2 TIMES DAILY
Qty: 180 TABLET | Refills: 3 | Status: SHIPPED | OUTPATIENT
Start: 2021-08-17 | End: 2023-02-03

## 2021-08-17 NOTE — PROGRESS NOTES
"[facility]  [facility] CARDIOLOGY  [address] [place] [address]  Dept: [phone]  Dept Fax: [phone]    Glenda Brown  1950    Follow Up Office Visit Note    History of Present Illness:  Glenda Brown is a 70 y.o. female who presents to the clinic for stress cardiomyopathy- She seems doing well, asymptomatic, her Ef initially in 2019 was 20% and improved to 60% also  In 2019, mild disease by cath , asymptomatic on Coreg 12.5 bid and also Entresto 49.51 bid, will keep same meds    The following portions of the patient's history were reviewed and updated as appropriate: allergies, current medications, past family history, past medical history, past social history, past surgical history and problem list.    Medications:  acetaminophen  alendronate  aspirin  carvedilol  desvenlafaxine  Entresto tablet  fentaNYL  gabapentin  ibuprofen  levothyroxine  OXYCODONE HCL PO  pravastatin  promethazine    Subjective  Allergies   Allergen Reactions   • Morphine Sulfate Other (See Comments)     \"MY BODY DOESN'T ABSORB IT LIKE IT SHOULD AND I OVERDOSED ON IT\"    • Oxycontin [Oxycodone Hcl] Nausea Only        Past Medical History:   Diagnosis Date   • Acquired hypothyroidism    • Back pain    • Chronic bilateral low back pain with bilateral sciatica    • Coronary artery disease of native artery of native heart with stable angina pectoris (CMS/HCC)    • Depression    • GERD (gastroesophageal reflux disease)    • Headache    • Heart attack (CMS/HCC) 02/2019   • Hyperlipidemia    • Low back pain    • Lumbago with sciatica, right side    • Migraine    • Moderate episode of recurrent major depressive disorder (CMS/HCC)    • 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    • Osteoporosis    • Other chronic pain    • Other intervertebral disc degeneration, lumbar region    • Other spondylosis with radiculopathy, lumbar region    • Persistent insomnia    • " "Pinched nerve 2007    ELBOW AND ARM   • Pulmonary nodules    • Radiculopathy of lumbar region    • Spondylosis without myelopathy or radiculopathy, lumbar region    • Stress-induced cardiomyopathy    • Thyroid disease    • Tobacco use        Past Surgical History:   Procedure Laterality Date   • COLON RESECTION     • COLONOSCOPY      5 YEARS AGO    • FOREARM SURGERY Right      \"PINCHED NERVES\"    • HYSTERECTOMY  1977   • LAMINECTOMY AND MICRODISCECTOMY LUMBAR SPINE N/A 9/1/2017    Procedure: LUMBAR LAMINECTOMY  L4-5;  Surgeon: Anders Aponte MD;  Location: [facility] OR;  Service:        Family History   Family history unknown: Yes        Social History     Socioeconomic History   • Marital status:      Spouse name: Not on file   • Number of children: Not on file   • Years of education: Not on file   • Highest education level: Not on file   Tobacco Use   • Smoking status: Current Every Day Smoker     Packs/day: 1.00     Years: 53.00     Pack years: 53.00     Types: Cigarettes     Start date: 1966   • Smokeless tobacco: Never Used   Substance and Sexual Activity   • Alcohol use: No   • Drug use: No   • Sexual activity: Defer       Review of Systems   Constitutional: Negative.    HENT: Negative.    Respiratory: Negative.    Cardiovascular: Negative.    Endocrine: Negative.    Genitourinary: Negative.    Musculoskeletal: Negative.    Skin: Negative.    Allergic/Immunologic: Negative.    Neurological: Negative.    Hematological: Negative.    Psychiatric/Behavioral: Negative.    All other systems reviewed and are negative.      Cardiovascular Procedures    ECHO/MUGA:   STRESS TESTS:   CARDIAC CATH:   DEVICES:   HOLTER:   CT/MRI:   VASCULAR:   CARDIOTHORACIC:     Objective  Vitals:    08/17/21 1454   BP: 102/52   BP Location: Right arm   Patient Position: Sitting   Cuff Size: Adult   Pulse: 72   Resp: 15   Temp: 97.1 °F (36.2 °C)   TempSrc: Infrared   SpO2: 95%   Weight: 52.6 kg (116 lb)   Height: 162.6 cm (64\") "   PainSc: 0-No pain     Body mass index is 19.91 kg/m².     Physical Exam  Constitutional:       Appearance: Healthy appearance. Not in distress.   Neck:      Vascular: No JVR. JVD normal.   Pulmonary:      Effort: Pulmonary effort is normal.      Breath sounds: Normal breath sounds. No wheezing. No rhonchi. No rales.   Chest:      Chest wall: Not tender to palpatation.   Cardiovascular:      PMI at left midclavicular line. Normal rate. Regular rhythm. Normal S1. Normal S2.      Murmurs: There is no murmur.      No gallop. No click. No rub.   Pulses:     Intact distal pulses.   Edema:     Peripheral edema absent.   Abdominal:      General: Bowel sounds are normal.      Palpations: Abdomen is soft.      Tenderness: There is no abdominal tenderness.   Musculoskeletal: Normal range of motion.         General: No tenderness. Skin:     General: Skin is warm and dry.   Neurological:      General: No focal deficit present.      Mental Status: Alert and oriented to person, place and time.          Diagnostic Data    ECG 12 Lead    Date/Time: 8/17/2021 3:18 PM  Performed by: Jeramy Mendez MD  Authorized by: Jeramy Mendez MD   Comparison: compared with previous ECG   Rhythm: sinus rhythm  Rate: normal  BPM: 66  QRS axis: normal    Clinical impression: normal ECG            Assessment and Plan  Diagnoses and all orders for this visit:    Essential hypertension, benign- Bp is fine 110.60  -     carvedilol (COREG) 12.5 MG tablet; Take 1 tablet by mouth 2 (Two) Times a Day.    Stress-induced cardiomyopathy- asymptomatic, on Coreg and Entresto, last echo Ef normalized   -     sacubitril-valsartan (Entresto) 49-51 MG tablet; Take 1 tablet by mouth 2 (Two) Times a Day.    Tobacco use- still smoking    Hyperlipidemia LDL goal <100- on Pravastatin, we need lab     Other orders  -     pravastatin (PRAVACHOL) 40 MG tablet; Take 1 tablet by mouth Daily.         Return in about 6 months (around 2/17/2022) for  Tio.    Jeramy Mendez MD  08/17/2021

## 2021-08-18 ENCOUNTER — LAB (OUTPATIENT)
Dept: CARDIOLOGY | Facility: CLINIC | Age: 71
End: 2021-08-18

## 2021-08-18 DIAGNOSIS — E78.5 HYPERLIPIDEMIA LDL GOAL <100: ICD-10-CM

## 2021-08-18 DIAGNOSIS — I10 ESSENTIAL HYPERTENSION, BENIGN: Primary | ICD-10-CM

## 2021-08-19 ENCOUNTER — TELEPHONE (OUTPATIENT)
Dept: CARDIOLOGY | Facility: CLINIC | Age: 71
End: 2021-08-19

## 2021-08-19 DIAGNOSIS — E78.5 HYPERLIPIDEMIA LDL GOAL <100: Primary | ICD-10-CM

## 2021-08-19 LAB
ALBUMIN SERPL-MCNC: 3.9 G/DL (ref 3.8–4.8)
ALBUMIN/GLOB SERPL: 1.7 {RATIO} (ref 1.2–2.2)
ALP SERPL-CCNC: 88 IU/L (ref 48–121)
ALT SERPL-CCNC: 7 IU/L (ref 0–32)
AST SERPL-CCNC: 19 IU/L (ref 0–40)
BASOPHILS # BLD AUTO: 0.1 X10E3/UL (ref 0–0.2)
BASOPHILS NFR BLD AUTO: 1 %
BILIRUB SERPL-MCNC: 0.3 MG/DL (ref 0–1.2)
BUN SERPL-MCNC: 13 MG/DL (ref 8–27)
BUN/CREAT SERPL: 15 (ref 12–28)
CALCIUM SERPL-MCNC: 9 MG/DL (ref 8.7–10.3)
CHLORIDE SERPL-SCNC: 103 MMOL/L (ref 96–106)
CHOLEST SERPL-MCNC: 139 MG/DL (ref 100–199)
CK SERPL-CCNC: 69 U/L (ref 32–182)
CO2 SERPL-SCNC: 27 MMOL/L (ref 20–29)
CREAT SERPL-MCNC: 0.89 MG/DL (ref 0.57–1)
EOSINOPHIL # BLD AUTO: 0.2 X10E3/UL (ref 0–0.4)
EOSINOPHIL NFR BLD AUTO: 3 %
ERYTHROCYTE [DISTWIDTH] IN BLOOD BY AUTOMATED COUNT: 13.2 % (ref 11.7–15.4)
GLOBULIN SER CALC-MCNC: 2.3 G/DL (ref 1.5–4.5)
GLUCOSE SERPL-MCNC: 114 MG/DL (ref 65–99)
HCT VFR BLD AUTO: 41.3 % (ref 34–46.6)
HDLC SERPL-MCNC: 35 MG/DL
HGB BLD-MCNC: 13.7 G/DL (ref 11.1–15.9)
IMM GRANULOCYTES # BLD AUTO: 0 X10E3/UL (ref 0–0.1)
IMM GRANULOCYTES NFR BLD AUTO: 0 %
LDLC SERPL CALC-MCNC: 87 MG/DL (ref 0–99)
LYMPHOCYTES # BLD AUTO: 2.2 X10E3/UL (ref 0.7–3.1)
LYMPHOCYTES NFR BLD AUTO: 28 %
MCH RBC QN AUTO: 31.7 PG (ref 26.6–33)
MCHC RBC AUTO-ENTMCNC: 33.2 G/DL (ref 31.5–35.7)
MCV RBC AUTO: 96 FL (ref 79–97)
MONOCYTES # BLD AUTO: 0.6 X10E3/UL (ref 0.1–0.9)
MONOCYTES NFR BLD AUTO: 7 %
NEUTROPHILS # BLD AUTO: 4.8 X10E3/UL (ref 1.4–7)
NEUTROPHILS NFR BLD AUTO: 61 %
PLATELET # BLD AUTO: 156 X10E3/UL (ref 150–450)
POTASSIUM SERPL-SCNC: 4.4 MMOL/L (ref 3.5–5.2)
PROT SERPL-MCNC: 6.2 G/DL (ref 6–8.5)
RBC # BLD AUTO: 4.32 X10E6/UL (ref 3.77–5.28)
SODIUM SERPL-SCNC: 143 MMOL/L (ref 134–144)
TRIGL SERPL-MCNC: 87 MG/DL (ref 0–149)
VLDLC SERPL CALC-MCNC: 17 MG/DL (ref 5–40)
WBC # BLD AUTO: 7.9 X10E3/UL (ref 3.4–10.8)

## 2021-08-19 RX ORDER — ROSUVASTATIN CALCIUM 20 MG/1
20 TABLET, COATED ORAL DAILY
COMMUNITY
End: 2021-08-19 | Stop reason: SDUPTHER

## 2021-08-19 RX ORDER — ROSUVASTATIN CALCIUM 20 MG/1
20 TABLET, COATED ORAL DAILY
Qty: 90 TABLET | Refills: 3 | Status: SHIPPED | OUTPATIENT
Start: 2021-08-19 | End: 2022-08-25 | Stop reason: SDUPTHER

## 2021-08-19 NOTE — TELEPHONE ENCOUNTER
LDL is high 87, she should do better with Crestor 20 mg, we need under 70       Left message for pt

## 2021-08-19 NOTE — TELEPHONE ENCOUNTER
LDL is high 87, she should do better with Crestor 20 mg, we need under 70       left message will send letter

## 2021-12-11 ENCOUNTER — OUTSIDE FACILITY SERVICE (OUTPATIENT)
Dept: CARDIOLOGY | Facility: CLINIC | Age: 71
End: 2021-12-11

## 2021-12-12 ENCOUNTER — OUTSIDE FACILITY SERVICE (OUTPATIENT)
Dept: CARDIOLOGY | Facility: CLINIC | Age: 71
End: 2021-12-12

## 2021-12-12 PROCEDURE — 99221 1ST HOSP IP/OBS SF/LOW 40: CPT | Performed by: INTERNAL MEDICINE

## 2021-12-12 PROCEDURE — 93306 TTE W/DOPPLER COMPLETE: CPT | Performed by: INTERNAL MEDICINE

## 2021-12-13 ENCOUNTER — OUTSIDE FACILITY SERVICE (OUTPATIENT)
Dept: CARDIOLOGY | Facility: CLINIC | Age: 71
End: 2021-12-13

## 2021-12-13 PROCEDURE — 99232 SBSQ HOSP IP/OBS MODERATE 35: CPT | Performed by: INTERNAL MEDICINE

## 2021-12-14 ENCOUNTER — OUTSIDE FACILITY SERVICE (OUTPATIENT)
Dept: CARDIOLOGY | Facility: CLINIC | Age: 71
End: 2021-12-14

## 2021-12-14 PROCEDURE — 99231 SBSQ HOSP IP/OBS SF/LOW 25: CPT | Performed by: INTERNAL MEDICINE

## 2021-12-15 ENCOUNTER — OUTSIDE FACILITY SERVICE (OUTPATIENT)
Dept: CARDIOLOGY | Facility: CLINIC | Age: 71
End: 2021-12-15

## 2021-12-15 PROCEDURE — 99231 SBSQ HOSP IP/OBS SF/LOW 25: CPT | Performed by: INTERNAL MEDICINE

## 2021-12-16 ENCOUNTER — OUTSIDE FACILITY SERVICE (OUTPATIENT)
Dept: CARDIOLOGY | Facility: CLINIC | Age: 71
End: 2021-12-16

## 2021-12-16 PROCEDURE — 99231 SBSQ HOSP IP/OBS SF/LOW 25: CPT | Performed by: INTERNAL MEDICINE

## 2022-08-25 ENCOUNTER — OFFICE VISIT (OUTPATIENT)
Dept: FAMILY MEDICINE CLINIC | Facility: CLINIC | Age: 72
End: 2022-08-25

## 2022-08-25 VITALS
HEIGHT: 63 IN | DIASTOLIC BLOOD PRESSURE: 70 MMHG | SYSTOLIC BLOOD PRESSURE: 100 MMHG | BODY MASS INDEX: 18.61 KG/M2 | WEIGHT: 105 LBS

## 2022-08-25 DIAGNOSIS — E03.9 ACQUIRED HYPOTHYROIDISM: Primary | ICD-10-CM

## 2022-08-25 DIAGNOSIS — E78.5 HYPERLIPIDEMIA LDL GOAL <100: ICD-10-CM

## 2022-08-25 PROCEDURE — 99214 OFFICE O/P EST MOD 30 MIN: CPT | Performed by: PHYSICIAN ASSISTANT

## 2022-08-25 PROCEDURE — 36415 COLL VENOUS BLD VENIPUNCTURE: CPT | Performed by: PHYSICIAN ASSISTANT

## 2022-08-25 RX ORDER — LEVOTHYROXINE SODIUM 0.07 MG/1
75 TABLET ORAL DAILY
Qty: 90 TABLET | Refills: 1 | Status: SHIPPED | OUTPATIENT
Start: 2022-08-25 | End: 2023-03-01

## 2022-08-25 RX ORDER — LEVOTHYROXINE SODIUM 0.07 MG/1
75 TABLET ORAL DAILY
COMMUNITY
End: 2022-08-25 | Stop reason: SDUPTHER

## 2022-08-25 RX ORDER — ROSUVASTATIN CALCIUM 20 MG/1
20 TABLET, COATED ORAL DAILY
Qty: 90 TABLET | Refills: 3 | Status: SHIPPED | OUTPATIENT
Start: 2022-08-25

## 2022-08-25 RX ORDER — ARIPIPRAZOLE 5 MG/1
5 TABLET ORAL DAILY
COMMUNITY
Start: 2022-03-25

## 2022-08-25 RX ORDER — MIRTAZAPINE 15 MG/1
15 TABLET, FILM COATED ORAL NIGHTLY
COMMUNITY

## 2022-08-25 NOTE — PROGRESS NOTES
"Chief Complaint  Hyperlipidemia (Patient needs medication refills) and Hypothyroidism (Patient needs medication refills)    Subjective        Glenda Brown presents to [facility]  Patient reports today secondary to needing medication refills.  Patient reports she has switched cardiologist and needs a refill of her cholesterol medication.  Patient also states she was going to endocrinology however no changes were made to her medication and she has been stable on the current dose she would like to start getting her levothyroxine through family practice.  Patient requests refills today.    Hyperlipidemia  Exacerbating diseases include hypothyroidism.   Hypothyroidism        Objective   Vital Signs:  /70 (BP Location: Left arm, Patient Position: Sitting, Cuff Size: Adult)   Ht 160 cm (63\")   Wt 47.6 kg (105 lb)   BMI 18.60 kg/m²   Estimated body mass index is 18.6 kg/m² as calculated from the following:    Height as of this encounter: 160 cm (63\").    Weight as of this encounter: 47.6 kg (105 lb).    BMI is within normal parameters. No other follow-up for BMI required.      Physical Exam  Vitals and nursing note reviewed.   Constitutional:       General: She is not in acute distress.     Appearance: Normal appearance.   HENT:      Head: Normocephalic.      Right Ear: Hearing normal.      Left Ear: Hearing normal.   Eyes:      Pupils: Pupils are equal, round, and reactive to light.   Cardiovascular:      Rate and Rhythm: Normal rate and regular rhythm.   Pulmonary:      Effort: Pulmonary effort is normal. No respiratory distress.   Skin:     General: Skin is warm and dry.   Neurological:      Mental Status: She is alert.   Psychiatric:         Mood and Affect: Mood normal.        Result Review :                Assessment and Plan   Diagnoses and all orders for this visit:    1. Acquired hypothyroidism (Primary)  Assessment & Plan:  Refill patient's medication she is no longer " going to the chronology so we will have blood work performed today.    Orders:  -     levothyroxine (Levoxyl) 75 MCG tablet; Take 1 tablet by mouth Daily. For thyroid  Dispense: 90 tablet; Refill: 1  -     TSH; Future  -     TSH    2. Hyperlipidemia LDL goal <100  Assessment & Plan:  Refill patient's medication she will follow-up with cardio in 2 months and have her level checked at that time.    Orders:  -     rosuvastatin (CRESTOR) 20 MG tablet; Take 1 tablet by mouth Daily. For cholesterol  Dispense: 90 tablet; Refill: 3           Follow Up   No follow-ups on file.  Patient was given instructions and counseling regarding her condition or for health maintenance advice. Please see specific information pulled into the AVS if appropriate.

## 2022-08-25 NOTE — ASSESSMENT & PLAN NOTE
Refill patient's medication she will follow-up with cardio in 2 months and have her level checked at that time.   stated

## 2022-08-25 NOTE — ASSESSMENT & PLAN NOTE
Refill patient's medication she is no longer going to the chronology so we will have blood work performed today.

## 2022-08-26 LAB — TSH SERPL DL<=0.005 MIU/L-ACNC: 0.45 UIU/ML (ref 0.45–4.5)

## 2023-02-03 ENCOUNTER — OFFICE VISIT (OUTPATIENT)
Dept: FAMILY MEDICINE CLINIC | Facility: CLINIC | Age: 73
End: 2023-02-03
Payer: MEDICARE

## 2023-02-03 VITALS
HEART RATE: 95 BPM | DIASTOLIC BLOOD PRESSURE: 64 MMHG | BODY MASS INDEX: 21.62 KG/M2 | SYSTOLIC BLOOD PRESSURE: 120 MMHG | WEIGHT: 122 LBS | OXYGEN SATURATION: 95 % | HEIGHT: 63 IN

## 2023-02-03 DIAGNOSIS — Z00.00 ENCOUNTER FOR ANNUAL WELLNESS VISIT (AWV) IN MEDICARE PATIENT: ICD-10-CM

## 2023-02-03 DIAGNOSIS — Z12.31 ENCOUNTER FOR SCREENING MAMMOGRAM FOR MALIGNANT NEOPLASM OF BREAST: ICD-10-CM

## 2023-02-03 DIAGNOSIS — Z72.0 TOBACCO USE: ICD-10-CM

## 2023-02-03 DIAGNOSIS — Z79.899 HIGH RISK MEDICATION USE: ICD-10-CM

## 2023-02-03 DIAGNOSIS — E03.9 ACQUIRED HYPOTHYROIDISM: Primary | ICD-10-CM

## 2023-02-03 PROCEDURE — 36415 COLL VENOUS BLD VENIPUNCTURE: CPT | Performed by: PHYSICIAN ASSISTANT

## 2023-02-03 PROCEDURE — 1170F FXNL STATUS ASSESSED: CPT | Performed by: PHYSICIAN ASSISTANT

## 2023-02-03 PROCEDURE — 99213 OFFICE O/P EST LOW 20 MIN: CPT | Performed by: PHYSICIAN ASSISTANT

## 2023-02-03 PROCEDURE — G0439 PPPS, SUBSEQ VISIT: HCPCS | Performed by: PHYSICIAN ASSISTANT

## 2023-02-03 PROCEDURE — 1159F MED LIST DOCD IN RCRD: CPT | Performed by: PHYSICIAN ASSISTANT

## 2023-02-03 RX ORDER — NORTRIPTYLINE HYDROCHLORIDE 25 MG/1
25 CAPSULE ORAL NIGHTLY
COMMUNITY

## 2023-02-03 NOTE — PROGRESS NOTES
The ABCs of the Annual Wellness Visit  Subsequent Medicare Wellness Visit    Subjective    Glenda Brown is a 72 y.o. female who presents for a Subsequent Medicare Wellness Visit.    The following portions of the patient's history were reviewed and   updated as appropriate: allergies, current medications, past family history, past medical history, past social history, past surgical history and problem list.    Compared to one year ago, the patient feels her physical   health is better.    Compared to one year ago, the patient feels her mental   health is better.    Recent Hospitalizations:  She was not admitted to the hospital within the past 365        Current Medical Providers:  Patient Care Team:  Jadon Ackerman MD as PCP - General (Family Medicine)  Ranjan Felix MD as Referring Physician (Pain Medicine)  Mala Kelly APRN as Referring Physician ([facility])    Outpatient Medications Prior to Visit   Medication Sig Dispense Refill   • alendronate (FOSAMAX) 70 MG tablet      • ARIPiprazole (ABILIFY) 5 MG tablet Take 5 mg by mouth Daily.     • aspirin 81 MG chewable tablet Chew 81 mg Daily.     • levothyroxine (Levoxyl) 75 MCG tablet Take 1 tablet by mouth Daily. For thyroid 90 tablet 1   • mirtazapine (REMERON) 15 MG tablet Take 15 mg by mouth Every Night.     • nortriptyline (PAMELOR) 25 MG capsule Take 25 mg by mouth Every Night.     • OXYCODONE HCL PO Take 15 mg by mouth 4 (Four) Times a Day As Needed (PAIN).     • rosuvastatin (CRESTOR) 20 MG tablet Take 1 tablet by mouth Daily. For cholesterol 90 tablet 3   • acetaminophen (TYLENOL) 500 MG tablet Take 500 mg by mouth Every 4 (Four) Hours As Needed for Mild Pain .     • carvedilol (COREG) 12.5 MG tablet Take 1 tablet by mouth 2 (Two) Times a Day. 180 tablet 3   • desvenlafaxine (PRISTIQ) 50 MG 24 hr tablet Take 50 mg by mouth Daily.     • fentaNYL (DURAGESIC) 75 MCG/HR patch Place 1 patch on the skin Every 72 (Seventy-Two) Hours.     •  gabapentin (NEURONTIN) 400 MG capsule Take 300 mg by mouth 3 (Three) Times a Day.     • ibuprofen (ADVIL,MOTRIN) 600 MG tablet Take 600 mg by mouth Every 6 (Six) Hours As Needed for Mild Pain (1-3).     • promethazine (PHENERGAN) 25 MG tablet Take 25 mg by mouth Daily As Needed for Nausea or Vomiting.     • sacubitril-valsartan (Entresto) 49-51 MG tablet Take 1 tablet by mouth 2 (Two) Times a Day. 180 tablet 3     No facility-administered medications prior to visit.       Opioid medication/s are on active medication list.  and I have evaluated her active treatment plan and pain score trends (see table).  There were no vitals filed for this visit.  I have reviewed the chart for potential of high risk medication and harmful drug interactions in the elderly.            Aspirin is on active medication list. Aspirin use is indicated based on review of current medical condition/s. Pros and cons of this therapy have been discussed today. Benefits of this medication outweigh potential harm.  Patient has been encouraged to continue taking this medication.  .      Patient Active Problem List   Diagnosis   • Spinal stenosis, lumbar region, with neurogenic claudication   • Lumbar stenosis with neurogenic claudication   • Herniation of lumbar intervertebral disc   • DDD (degenerative disc disease), lumbar   • Stress-induced cardiomyopathy   • Hyperlipidemia LDL goal <100   • Tobacco use   • Acquired hypothyroidism   • Encounter for annual wellness visit (AWV) in Medicare patient   • Encounter for screening mammogram for malignant neoplasm of breast   • High risk medication use     Advance Care Planning  Advance Directive is not on file.  ACP discussion was held with the patient during this visit. Patient does not have an advance directive, information provided.     Objective    Vitals:    02/03/23 1344   BP: 120/64   BP Location: Left arm   Patient Position: Sitting   Cuff Size: Adult   Pulse: 95   SpO2: 95%   Weight: 55.3 kg  "(122 lb)   Height: 160 cm (63\")     Estimated body mass index is 21.61 kg/m² as calculated from the following:    Height as of this encounter: 160 cm (63\").    Weight as of this encounter: 55.3 kg (122 lb).    BMI is within normal parameters. No other follow-up for BMI required.      Does the patient have evidence of cognitive impairment? No          HEALTH RISK ASSESSMENT    Smoking Status:  Social History     Tobacco Use   Smoking Status Former   • Packs/day: 1.00   • Years: 53.00   • Pack years: 53.00   • Types: Cigarettes   • Start date:    • Quit date:    • Years since quittin.0   Smokeless Tobacco Never     Alcohol Consumption:  Social History     Substance and Sexual Activity   Alcohol Use No     Fall Risk Screen:    SHANIA Fall Risk Assessment has not been completed.    Depression Screening:  PHQ-2/PHQ-9 Depression Screening 2/3/2023   Little Interest or Pleasure in Doing Things 1-->several days   Feeling Down, Depressed or Hopeless 1-->several days   Trouble Falling or Staying Asleep, or Sleeping Too Much 1-->several days   Feeling Tired or Having Little Energy 0-->not at all   Poor Appetite or Overeating 0-->not at all   Feeling Bad about Yourself - or that You are a Failure or Have Let Yourself or Your Family Down 0-->not at all   Trouble Concentrating on Things, Such as Reading the Newspaper or Watching Television 0-->not at all   Moving or Speaking So Slowly that Other People Could Have Noticed? Or the Opposite - Being So Fidgety 0-->not at all   Thoughts that You Would be Better Off Dead or of Hurting Yourself in Some Way 0-->not at all   PHQ-9: Brief Depression Severity Measure Score 3   If You Checked Off Any Problems, How Difficult Have These Problems Made It For You to Do Your Work, Take Care of Things at Home, or Get Along with Other People? somewhat difficult       Health Habits and Functional and Cognitive Screening:  Functional & Cognitive Status 2/3/2023   Do you have difficulty " preparing food and eating? No   Do you have difficulty bathing yourself, getting dressed or grooming yourself? No   Do you have difficulty using the toilet? No   Do you have difficulty moving around from place to place? No   Do you have trouble with steps or getting out of a bed or a chair? No   Current Diet Frequent Junk Food   Dental Exam Up to date   Eye Exam Not up to date   Exercise (times per week) 0 times per week   Do you need help using the phone?  No   Are you deaf or do you have serious difficulty hearing?  No   Do you need help with transportation? No   Do you need help shopping? No   Do you need help preparing meals?  No   Do you need help with housework?  No   Do you need help with laundry? No   Do you need help taking your medications? No   Do you need help managing money? No   Do you ever drive or ride in a car without wearing a seat belt? No   Have you felt unusual stress, anger or loneliness in the last month? No   Who do you live with? Alone   If you need help, do you have trouble finding someone available to you? No   Have you been bothered in the last four weeks by sexual problems? No   Do you have difficulty concentrating, remembering or making decisions? No       Age-appropriate Screening Schedule:  Refer to the list below for future screening recommendations based on patient's age, sex and/or medical conditions. Orders for these recommended tests are listed in the plan section. The patient has been provided with a written plan.    Health Maintenance   Topic Date Due   • TDAP/TD VACCINES (1 - Tdap) Never done   • ZOSTER VACCINE (1 of 2) Never done   • DXA SCAN  05/03/2020   • LIPID PANEL  08/18/2022   • MAMMOGRAM  03/23/2023   • INFLUENZA VACCINE  Completed                CMS Preventative Services Quick Reference  Risk Factors Identified During Encounter  Chronic Pain: patient is followed by pain management  The above risks/problems have been discussed with the patient.  Pertinent information  "has been shared with the patient in the After Visit Summary.  An After Visit Summary and PPPS were made available to the patient.    Follow Up:   Next Medicare Wellness visit to be scheduled in 1 year.       Additional E&M Note during same encounter follows:  Patient has multiple medical problems which are significant and separately identifiable that require additional work above and beyond the Medicare Wellness Visit.      Chief Complaint  Hypothyroidism and Left eye issues    Subjective        HPI  Glenda Brown is also being seen today for medication refills, blood work and schedule mammogram.    Patient reports hypothyroidism, states she has not missed any doses of her medication.  Reports she has been stable on 75mcg for over a year.    Patient reports chronic pain and states she continues follow up with pain management.  States she is due for blood work and would like it performed with PCP.    Patient states she is not up to date with mammogram and would like to scheduled.    Patient continues to smoke daily and declines cessation this date.          Objective   Vital Signs:  /64 (BP Location: Left arm, Patient Position: Sitting, Cuff Size: Adult)   Pulse 95   Ht 160 cm (63\")   Wt 55.3 kg (122 lb)   SpO2 95%   BMI 21.61 kg/m²     Physical Exam                    Assessment and Plan   Diagnoses and all orders for this visit:    1. Acquired hypothyroidism (Primary)  Assessment & Plan:  Patient has been stable on 75 mcg we will however check TSH today    Orders:  -     TSH; Future  -     TSH    2. Encounter for annual wellness visit (AWV) in Medicare patient  Assessment & Plan:  Updated annual wellness visit checklist.  Immunizations discussed.  Screening up-to-date.  Recommend yearly dental and eye exams. Also discussed monitoring of blood pressure and lipids. We addressed patient self-assessment of health status, frailty, and physical functioning. We reviewed psychosocial risks, behavioral risks, " instrumental activities of daily living, and patient health risk assessment. Patient was given a personalized prevention plan        3. Tobacco use  Assessment & Plan:  Patient declines cessation at this time she however would like low-dose CT scan    Orders:  -     CT Chest Low Dose Follow Up Without Contrast; Future    4. Encounter for screening mammogram for malignant neoplasm of breast  Assessment & Plan:  We will place order for patient to get mammogram    Orders:  -     Mammo Screening Bilateral With CAD; Future    5. High risk medication use  Assessment & Plan:  We will check kidney and liver function this date.  Patient's pain medications are monitored by pain clinic    Orders:  -     Comprehensive Metabolic Panel; Future  -     Comprehensive Metabolic Panel           Follow Up   No follow-ups on file.  Patient was given instructions and counseling regarding her condition or for health maintenance advice. Please see specific information pulled into the AVS if appropriate.

## 2023-02-03 NOTE — ASSESSMENT & PLAN NOTE
We will check kidney and liver function this date.  Patient's pain medications are monitored by pain clinic

## 2023-02-04 LAB
ALBUMIN SERPL-MCNC: 3.8 G/DL (ref 3.7–4.7)
ALBUMIN/GLOB SERPL: 1.7 {RATIO} (ref 1.2–2.2)
ALP SERPL-CCNC: 81 IU/L (ref 44–121)
ALT SERPL-CCNC: 18 IU/L (ref 0–32)
AST SERPL-CCNC: 34 IU/L (ref 0–40)
BILIRUB SERPL-MCNC: <0.2 MG/DL (ref 0–1.2)
BUN SERPL-MCNC: 20 MG/DL (ref 8–27)
BUN/CREAT SERPL: 13 (ref 12–28)
CALCIUM SERPL-MCNC: 9.2 MG/DL (ref 8.7–10.3)
CHLORIDE SERPL-SCNC: 100 MMOL/L (ref 96–106)
CO2 SERPL-SCNC: 23 MMOL/L (ref 20–29)
CREAT SERPL-MCNC: 1.57 MG/DL (ref 0.57–1)
EGFRCR SERPLBLD CKD-EPI 2021: 35 ML/MIN/1.73
GLOBULIN SER CALC-MCNC: 2.3 G/DL (ref 1.5–4.5)
GLUCOSE SERPL-MCNC: 104 MG/DL (ref 70–99)
POTASSIUM SERPL-SCNC: 4.5 MMOL/L (ref 3.5–5.2)
PROT SERPL-MCNC: 6.1 G/DL (ref 6–8.5)
SODIUM SERPL-SCNC: 140 MMOL/L (ref 134–144)
TSH SERPL DL<=0.005 MIU/L-ACNC: 0.18 UIU/ML (ref 0.45–4.5)

## 2023-02-06 ENCOUNTER — TELEPHONE (OUTPATIENT)
Dept: FAMILY MEDICINE CLINIC | Facility: CLINIC | Age: 73
End: 2023-02-06
Payer: MEDICARE

## 2023-02-06 DIAGNOSIS — E03.9 ACQUIRED HYPOTHYROIDISM: Primary | ICD-10-CM

## 2023-02-06 NOTE — PROGRESS NOTES
.*HUB MAY READ*  Please advise patient that her thyroid level was a little low however I am not can adjust her medication at this time.  Did advise her to return to clinic in a month for blood work only for recheck of thyroid level.  Her kidney function remains low so she needs to continue follow-up with nephrology/kidney doctor.  Otherwise blood work is okay.

## 2023-02-06 NOTE — TELEPHONE ENCOUNTER
----- Message from Cheryl Joseph PA-C sent at 2/6/2023  2:04 PM EST -----  .#HUB MAY READ#  Please advise patient that her thyroid level was a little low however I am not can adjust her medication at this time.  Did advise her to return to clinic in a month for blood work only for recheck of thyroid level.  Her kidney function remains low so she needs to continue follow-up with nephrology/kidney doctor.  Otherwise blood work is okay.

## 2023-02-27 ENCOUNTER — LAB (OUTPATIENT)
Dept: FAMILY MEDICINE CLINIC | Facility: CLINIC | Age: 73
End: 2023-02-27
Payer: MEDICARE

## 2023-02-27 DIAGNOSIS — E03.9 ACQUIRED HYPOTHYROIDISM: ICD-10-CM

## 2023-02-27 PROCEDURE — 36415 COLL VENOUS BLD VENIPUNCTURE: CPT | Performed by: PHYSICIAN ASSISTANT

## 2023-02-28 LAB — TSH SERPL DL<=0.005 MIU/L-ACNC: 0.19 UIU/ML (ref 0.45–4.5)

## 2023-03-01 DIAGNOSIS — E03.9 ACQUIRED HYPOTHYROIDISM: Primary | ICD-10-CM

## 2023-03-01 RX ORDER — LEVOTHYROXINE SODIUM 0.05 MG/1
50 TABLET ORAL DAILY
Qty: 90 TABLET | Refills: 0 | Status: SHIPPED | OUTPATIENT
Start: 2023-03-01

## 2023-03-02 ENCOUNTER — TELEPHONE (OUTPATIENT)
Dept: FAMILY MEDICINE CLINIC | Facility: CLINIC | Age: 73
End: 2023-03-02
Payer: MEDICARE

## 2023-03-02 NOTE — TELEPHONE ENCOUNTER
Caller: Kevin Glenda J    Relationship: Self    Best call back number: [phone]     What medications are you currently taking:   Current Outpatient Medications on File Prior to Visit   Medication Sig Dispense Refill   • alendronate (FOSAMAX) 70 MG tablet      • ARIPiprazole (ABILIFY) 5 MG tablet Take 5 mg by mouth Daily.     • aspirin 81 MG chewable tablet Chew 81 mg Daily.     • levothyroxine (Synthroid) 50 MCG tablet Take 1 tablet by mouth Daily. *note dose change* 90 tablet 0   • mirtazapine (REMERON) 15 MG tablet Take 15 mg by mouth Every Night.     • nortriptyline (PAMELOR) 25 MG capsule Take 25 mg by mouth Every Night.     • OXYCODONE HCL PO Take 15 mg by mouth 4 (Four) Times a Day As Needed (PAIN).     • rosuvastatin (CRESTOR) 20 MG tablet Take 1 tablet by mouth Daily. For cholesterol 90 tablet 3     No current facility-administered medications on file prior to visit.           Which medication are you concerned about: LEVOTHYROXINE    Who prescribed you this medication: MARNI    What are your concerns: THYROID TEST CAME BACK LOW, I WAS TAKING 75 MCG LEVO BUT MARNI DROPPED IT TO 50 MCG.      PLEASE CALL TO [facility]

## 2023-05-26 DIAGNOSIS — E03.9 ACQUIRED HYPOTHYROIDISM: ICD-10-CM

## 2023-05-26 RX ORDER — LEVOTHYROXINE SODIUM 0.05 MG/1
50 TABLET ORAL DAILY
Qty: 90 TABLET | Refills: 0 | Status: SHIPPED | OUTPATIENT
Start: 2023-05-26

## 2023-05-26 NOTE — TELEPHONE ENCOUNTER
Caller: Glenda Brown    Relationship: Self    Best call back number: [phone]    Requested Prescriptions:   Requested Prescriptions     Pending Prescriptions Disp Refills   • levothyroxine (Synthroid) 50 MCG tablet 90 tablet 0     Sig: Take 1 tablet by mouth Daily. *note dose change*        Pharm[facility] where [facility] be sent[id number] [address] [facility] [address] [facility] [address]  [address] [facility] [phone] [facility] [phone] FX     Last office visit with prescribing clinician: Visit date not found   Last telemedicine visit with prescribing clinician: Visit date not found   Next office visit with prescribing clinician: Visit date not found     Additional details provided by patient: DOES PATIENT NEED LAB DRAW BEFORE PRESCRIPTION?    Does the patient have less than a 3 day supply:  [x] Yes  [] No    Would you like a call back once the refill request has been completed: [x] Yes [] No    If the office needs to give you a call back, can they leave a voicemail: [x] Yes [] No    Nataliya Judge   05/26/23 09:39 EDT

## 2023-09-05 DIAGNOSIS — E03.9 ACQUIRED HYPOTHYROIDISM: ICD-10-CM

## 2023-09-05 RX ORDER — LEVOTHYROXINE SODIUM 0.05 MG/1
TABLET ORAL
Qty: 90 TABLET | Refills: 0 | OUTPATIENT
Start: 2023-09-05

## 2023-09-05 NOTE — TELEPHONE ENCOUNTER
Caller: Kevin Glenda FANNY    Relationship: Self    Best call back number: [phone]     Requested Prescriptions:   Requested Prescriptions     Pending Prescriptions Disp Refills    levothyroxine (SYNTHROID, LEVOTHROID) 50 MCG tablet [Pharmacy Med Name: Levothyroxine Sodium Oral Tablet 50 MCG] 90 tablet 0     Sig: TAKE 1 TABLET BY MOUTH EVERY DAY        Pharmacy where request should be sent: [id number] - [place], [place] - [address] [place] [address] - [phone] [facility] [phone]      Last office visit with prescribing clinician: Visit date not found   Last telemedicine visit with prescribing clinician: Visit date not found   Next office visit with prescribing clinician: Visit date not found     Additional details provided by patient: PATIENT STATES SHE IS LEAVING FOR VACATION ON 9.8.23 AND RETURNING ON 9.17.23. SHE STATES SHE DOES NOT HAVE ENOUGH MEDICATION TO GET HER THROUGH HER TRIP AND IS REQUESTING A REFILL.     PATIENT HAS 2 PILLS LEFT.     PLEASE CALL PATIENT BACK, IF NO ANSWER LEAVE A DETAILED MESSAGE.    Kasie Fonseca Rep   09/05/23 09:40 EDT

## 2023-09-07 ENCOUNTER — TELEMEDICINE (OUTPATIENT)
Dept: FAMILY MEDICINE CLINIC | Facility: CLINIC | Age: 73
End: 2023-09-07
Payer: MEDICARE

## 2023-09-07 DIAGNOSIS — M54.42 CHRONIC BILATERAL LOW BACK PAIN WITH BILATERAL SCIATICA: ICD-10-CM

## 2023-09-07 DIAGNOSIS — G89.29 CHRONIC BILATERAL LOW BACK PAIN WITH BILATERAL SCIATICA: ICD-10-CM

## 2023-09-07 DIAGNOSIS — E03.9 ACQUIRED HYPOTHYROIDISM: Primary | ICD-10-CM

## 2023-09-07 DIAGNOSIS — M54.41 CHRONIC BILATERAL LOW BACK PAIN WITH BILATERAL SCIATICA: ICD-10-CM

## 2023-09-07 DIAGNOSIS — N28.9 RENAL INSUFFICIENCY: ICD-10-CM

## 2023-09-07 RX ORDER — LEVOTHYROXINE SODIUM 0.05 MG/1
50 TABLET ORAL DAILY
Qty: 90 TABLET | Refills: 1 | Status: SHIPPED | OUTPATIENT
Start: 2023-09-07 | End: 2023-09-08 | Stop reason: SDUPTHER

## 2023-09-07 NOTE — PROGRESS NOTES
Chief Complaint  Hypothyroidism    Subjective          Glenda Brown presents to [facility]  History of Present Illness  She comes in today saying that she still having some chronic back pains and difficulties she also reports she needs her thyroid level followed up on she is about the rest of her medication she says otherwise she has developed some cataract she is having to deal with that she still having some chronic issues with her back pain is well.  Patient consents to have her visit done through [email or web address] today she is at home and I am in the clinic  Hypothyroidism  Associated symptoms include arthralgias. Pertinent negatives include no congestion, nausea, rash or sore throat.     Objective   Vital Signs:   There were no vitals taken for this visit.    There is no height or weight on file to calculate BMI.    Review of Systems   Constitutional: Negative.    HENT:  Negative for congestion, dental problem, ear discharge, ear pain and sore throat.    Respiratory:  Negative for apnea, chest tightness and shortness of breath.    Gastrointestinal:  Negative for constipation and nausea.   Endocrine: Negative for polyuria.   Genitourinary:  Negative for difficulty urinating.   Musculoskeletal:  Positive for arthralgias and back pain. Negative for gait problem.   Skin:  Negative for rash.   Hematological:  Negative for adenopathy.     Past History:  Medical History: has a past medical history of Acquired hypothyroidism, Back pain, Chronic bilateral low back pain with bilateral sciatica, Coronary artery disease of native artery of native heart with stable angina pectoris, Depression, GERD (gastroesophageal reflux disease), Headache, Heart attack (02/2019), Hyperlipidemia, Low back pain, Lumbago with sciatica, right side, Migraine, Moderate episode of recurrent major depressive disorder, 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, Osteoporosis, Other chronic  pain, Other intervertebral disc degeneration, lumbar region, Other spondylosis with radiculopathy, lumbar region, Persistent insomnia, Pinched nerve (2007), Pulmonary nodules, Radiculopathy of lumbar region, Spondylosis without myelopathy or radiculopathy, lumbar region, Stress-induced cardiomyopathy, Thyroid disease, and Tobacco use.   Surgical History: has a past surgical history that includes Hysterectomy (1977); Forearm surgery (Right); Colectomy; Colonoscopy; and Laminectomy and microdiscectomy lumbar spine (N/A, 9/1/2017).         Current Outpatient Medications:     aspirin 81 MG chewable tablet, Chew 1 tablet Daily., Disp: , Rfl:     levothyroxine (Synthroid) 50 MCG tablet, Take 1 tablet by mouth Daily. *note dose change*, Disp: 90 tablet, Rfl: 1    OXYCODONE HCL PO, Take 15 mg by mouth 4 (Four) Times a Day As Needed (PAIN)., Disp: , Rfl:     rosuvastatin (CRESTOR) 20 MG tablet, Take 1 tablet by mouth Daily. For cholesterol, Disp: 90 tablet, Rfl: 3    Allergies: Morphine, Morphine sulfate, and Oxycodone hcl    Physical Exam  Constitutional:       Appearance: Normal appearance.   HENT:      Right Ear: External ear normal.      Left Ear: External ear normal.      Nose: Nose normal.      Mouth/Throat:      Mouth: Mucous membranes are moist.   Eyes:      Pupils: Pupils are equal, round, and reactive to light.   Neurological:      Mental Status: She is alert and oriented to person, place, and time. Mental status is at baseline.   Psychiatric:         Mood and Affect: Mood normal.        Result Review :                   Assessment and Plan    Diagnoses and all orders for this visit:    1. Acquired hypothyroidism (Primary)  Comments:  We will get blood work set up and refill medication  Orders:  -     levothyroxine (Synthroid) 50 MCG tablet; Take 1 tablet by mouth Daily. *note dose change*  Dispense: 90 tablet; Refill: 1  -     TSH; Future  -     T3, free; Future    2. Chronic bilateral low back pain with bilateral  sciatica  Comments:  Continues to see pain management    3. Renal insufficiency  Comments:  Nephrology recently stopped a bunch of her meds              Follow Up   No follow-ups on file.  Patient was given instructions and counseling regarding her condition or for health maintenance advice. Please see specific information pulled into the AVS if appropriate.     Jadon Ackerman MD

## 2023-09-08 ENCOUNTER — TELEPHONE (OUTPATIENT)
Dept: FAMILY MEDICINE CLINIC | Facility: CLINIC | Age: 73
End: 2023-09-08
Payer: MEDICARE

## 2023-09-08 DIAGNOSIS — E03.9 ACQUIRED HYPOTHYROIDISM: ICD-10-CM

## 2023-09-08 LAB
T3FREE SERPL-MCNC: 2.4 PG/ML (ref 2–4.4)
TSH SERPL DL<=0.005 MIU/L-ACNC: 0.86 UIU/ML (ref 0.45–4.5)

## 2023-09-08 RX ORDER — LEVOTHYROXINE SODIUM 0.05 MG/1
50 TABLET ORAL DAILY
Qty: 90 TABLET | Refills: 1 | Status: SHIPPED | OUTPATIENT
Start: 2023-09-08

## 2023-09-11 RX ORDER — LEVOTHYROXINE SODIUM 0.05 MG/1
TABLET ORAL
Qty: 90 TABLET | Refills: 0 | Status: SHIPPED | OUTPATIENT
Start: 2023-09-11

## 2023-09-18 DIAGNOSIS — E78.5 HYPERLIPIDEMIA LDL GOAL <100: ICD-10-CM

## 2023-09-18 RX ORDER — ROSUVASTATIN CALCIUM 20 MG/1
20 TABLET, COATED ORAL DAILY
Qty: 90 TABLET | Refills: 0 | Status: SHIPPED | OUTPATIENT
Start: 2023-09-18

## 2023-09-18 NOTE — TELEPHONE ENCOUNTER
Caller: Kevin Glenda FANNY    Relationship: Self    Best call back number: [phone]     Requested Prescriptions:   Requested Prescriptions     Pending Prescriptions Disp Refills    rosuvastatin (CRESTOR) 20 MG tablet 90 tablet 3     Sig: Take 1 tablet by mouth Daily. For cholesterol        Pharmacy where request should be sent: [id number] - [place], [place] - [address] [place] [address] [facility] [address] [facility] [address]  [address] [facility] [phone] [facility] [phone] FX     Last office visit with prescribing clinician: Visit date not found   Last telemedicine visit with prescribing clinician: 9/7/2023   Next office visit with prescribing clinician: Visit date not found     Additional details provided by patient: PATIENT STATED THAT HER CARDIOLOGIST MOVED TO [place] AND SHE HAS NOT FOUND A NEW ONE.     Does the patient have less than a 3 day supply:  [] Yes  [x] No    Would you like a call back once the refill request has been completed: [x] Yes [] No    If the office needs to give you a call back, can they leave a voicemail: [x] Yes [] No    Kasie Romo Rep   09/18/23 13:39 EDT

## 2023-11-08 ENCOUNTER — OFFICE VISIT (OUTPATIENT)
Dept: FAMILY MEDICINE CLINIC | Facility: CLINIC | Age: 73
End: 2023-11-08
Payer: MEDICARE

## 2023-11-08 VITALS
DIASTOLIC BLOOD PRESSURE: 60 MMHG | BODY MASS INDEX: 18.43 KG/M2 | WEIGHT: 104 LBS | HEIGHT: 63 IN | SYSTOLIC BLOOD PRESSURE: 112 MMHG

## 2023-11-08 DIAGNOSIS — F33.1 MODERATE RECURRENT MAJOR DEPRESSION: ICD-10-CM

## 2023-11-08 DIAGNOSIS — R63.4 WEIGHT LOSS, UNINTENTIONAL: Primary | ICD-10-CM

## 2023-11-08 PROCEDURE — 99213 OFFICE O/P EST LOW 20 MIN: CPT | Performed by: PHYSICIAN ASSISTANT

## 2023-11-08 PROCEDURE — 1159F MED LIST DOCD IN RCRD: CPT | Performed by: PHYSICIAN ASSISTANT

## 2023-11-08 PROCEDURE — 1160F RVW MEDS BY RX/DR IN RCRD: CPT | Performed by: PHYSICIAN ASSISTANT

## 2023-11-08 RX ORDER — MIRTAZAPINE 7.5 MG/1
7.5 TABLET, FILM COATED ORAL NIGHTLY
Qty: 30 TABLET | Refills: 1 | Status: SHIPPED | OUTPATIENT
Start: 2023-11-08

## 2023-11-08 NOTE — ASSESSMENT & PLAN NOTE
Patient and daughter both declined follow-up with gastroenterology at this time secondary to believing her decreased appetite is mood oriented.  Patient prescribed mirtazapine to help with depression and stimulate appetite.  Patient return to clinic for follow-up in 4 weeks.  Discussed possible side effects and she acknowledged understanding.

## 2023-11-08 NOTE — PROGRESS NOTES
"Chief Complaint  Weight Loss    Subjective        Glenda Brown presents to [facility]  History of Present Illness  Patient reports today with her daughter secondary to losing weight unintentionally.  Reports she is down 20 pounds since the spring.  Patient reports having decreased appetite states she usually skips breakfast and lunch and has a few bites for dinner.  Patient's daughter reports she complains of feeling full after only a few bites.  Patient has a history of moderate depression and states she discontinued all of her medications this year secondary to having decreased kidney function.  Patient reports she is concerned that her psych meds will interfere with her kidney function.  Patient reports having normal Cologuard 2 years ago.  Denies ever having EGD.  Reports smoking daily and states her low-dose CT scan is up-to-date.  Weight Loss        Objective   Vital Signs:  /60 (BP Location: Left arm, Patient Position: Sitting, Cuff Size: Adult)   Ht 160 cm (63\")   Wt 47.2 kg (104 lb)   BMI 18.42 kg/m²   Estimated body mass index is 18.42 kg/m² as calculated from the following:    Height as of this encounter: 160 cm (63\").    Weight as of this encounter: 47.2 kg (104 lb).             Physical Exam  Vitals and nursing note reviewed.   Constitutional:       General: She is not in acute distress.     Appearance: Normal appearance. She is underweight.   HENT:      Head: Normocephalic.      Right Ear: Hearing normal.      Left Ear: Hearing normal.   Eyes:      Pupils: Pupils are equal, round, and reactive to light.   Cardiovascular:      Rate and Rhythm: Normal rate and regular rhythm.   Pulmonary:      Effort: Pulmonary effort is normal. No respiratory distress.   Skin:     General: Skin is warm and dry.   Neurological:      Mental Status: She is alert.   Psychiatric:         Mood and Affect: Mood normal.        Result Review :                   Assessment and Plan "   Diagnoses and all orders for this visit:    1. Weight loss, unintentional (Primary)  Assessment & Plan:  Patient and daughter both declined follow-up with gastroenterology at this time secondary to believing her decreased appetite is mood oriented.  Patient prescribed mirtazapine to help with depression and stimulate appetite.  Patient return to clinic for follow-up in 4 weeks.  Discussed possible side effects and she acknowledged understanding.      2. Moderate recurrent major depression  Assessment & Plan:  Patient's depression is recurrent and is moderate without psychosis. Their depression is currently active and the condition is worsening. This will be reassessed in 4 weeks. F/U as described:patient was prescribed an antidepressant medicine.               Follow Up   Return in about 4 weeks (around 12/6/2023).  Patient was given instructions and counseling regarding her condition or for health maintenance advice. Please see specific information pulled into the AVS if appropriate.

## 2023-12-08 ENCOUNTER — OFFICE VISIT (OUTPATIENT)
Dept: FAMILY MEDICINE CLINIC | Facility: CLINIC | Age: 73
End: 2023-12-08
Payer: MEDICARE

## 2023-12-08 VITALS
BODY MASS INDEX: 18.42 KG/M2 | SYSTOLIC BLOOD PRESSURE: 118 MMHG | OXYGEN SATURATION: 96 % | WEIGHT: 104 LBS | DIASTOLIC BLOOD PRESSURE: 60 MMHG | HEART RATE: 87 BPM

## 2023-12-08 DIAGNOSIS — E78.5 HYPERLIPIDEMIA LDL GOAL <100: ICD-10-CM

## 2023-12-08 DIAGNOSIS — E03.9 ACQUIRED HYPOTHYROIDISM: ICD-10-CM

## 2023-12-08 DIAGNOSIS — H92.01 ACUTE EAR PAIN, RIGHT: ICD-10-CM

## 2023-12-08 DIAGNOSIS — R63.4 WEIGHT LOSS, UNINTENTIONAL: ICD-10-CM

## 2023-12-08 DIAGNOSIS — Z23 ENCOUNTER FOR IMMUNIZATION: ICD-10-CM

## 2023-12-08 DIAGNOSIS — F33.1 MODERATE RECURRENT MAJOR DEPRESSION: Primary | ICD-10-CM

## 2023-12-08 PROCEDURE — 1159F MED LIST DOCD IN RCRD: CPT | Performed by: PHYSICIAN ASSISTANT

## 2023-12-08 PROCEDURE — 1160F RVW MEDS BY RX/DR IN RCRD: CPT | Performed by: PHYSICIAN ASSISTANT

## 2023-12-08 PROCEDURE — G0008 ADMIN INFLUENZA VIRUS VAC: HCPCS | Performed by: PHYSICIAN ASSISTANT

## 2023-12-08 PROCEDURE — 90662 IIV NO PRSV INCREASED AG IM: CPT | Performed by: PHYSICIAN ASSISTANT

## 2023-12-08 PROCEDURE — 99214 OFFICE O/P EST MOD 30 MIN: CPT | Performed by: PHYSICIAN ASSISTANT

## 2023-12-08 RX ORDER — PROMETHAZINE HYDROCHLORIDE 25 MG/1
25 TABLET ORAL DAILY
COMMUNITY
Start: 2023-12-02

## 2023-12-08 RX ORDER — LEVOTHYROXINE SODIUM 0.05 MG/1
50 TABLET ORAL DAILY
Qty: 90 TABLET | Refills: 1 | Status: SHIPPED | OUTPATIENT
Start: 2023-12-08

## 2023-12-08 RX ORDER — ROSUVASTATIN CALCIUM 20 MG/1
20 TABLET, COATED ORAL DAILY
Qty: 90 TABLET | Refills: 1 | Status: SHIPPED | OUTPATIENT
Start: 2023-12-08

## 2023-12-08 RX ORDER — MIRTAZAPINE 15 MG/1
TABLET, FILM COATED ORAL
Qty: 60 TABLET | Refills: 1 | Status: SHIPPED | OUTPATIENT
Start: 2023-12-08

## 2023-12-08 NOTE — ASSESSMENT & PLAN NOTE
Patient's depression is recurrent and is moderate without psychosis. Their depression is currently active and the condition is unchanged. This will be reassessed in 4 weeks. F/U as described: Patient again advised to reestablish care with her psychiatrist.  Will increase Remeron this date which may help with both appetite and mood .

## 2023-12-08 NOTE — PROGRESS NOTES
"Chief Complaint  Hyperlipidemia, Hypothyroidism, and Arthritis    Subjective        Glenda Brown presents to [facility]  History of Present Illness  Patient reports today for follow-up secondary to starting Remeron last visit.  Patient reports having weight loss secondary to depression.  Patient states she has been taking Remeron for 4 weeks and feels like she is eating more meals throughout the day.  Patient reports her weight is stable.  Patient states her mood has not changed and she continues to display depression.  Patient reports she has not re-establish care with her psychiatrist    Patient reports having right ear pain states that been ongoing for about a week.  Patient denies any fever or chills, denies any cough or nasal drainage.  Hyperlipidemia  Exacerbating diseases include hypothyroidism.   Hypothyroidism    Arthritis        Objective   Vital Signs:  /60   Pulse 87   Wt 47.2 kg (104 lb)   SpO2 96%   BMI 18.42 kg/m²   Estimated body mass index is 18.42 kg/m² as calculated from the following:    Height as of 11/8/23: 160 cm (63\").    Weight as of this encounter: 47.2 kg (104 lb).             Physical Exam  Vitals and nursing note reviewed.   Constitutional:       General: She is not in acute distress.     Appearance: She is ill-appearing.   HENT:      Head: Normocephalic.      Right Ear: Hearing normal.      Left Ear: Hearing and tympanic membrane normal.      Ears:      Comments: Left tympanic membrane partially occluded secondary to cerumen    Patient with effusion right ear  Eyes:      Pupils: Pupils are equal, round, and reactive to light.   Cardiovascular:      Rate and Rhythm: Normal rate and regular rhythm.   Pulmonary:      Effort: Pulmonary effort is normal. No respiratory distress.   Skin:     General: Skin is warm and dry.   Neurological:      Mental Status: She is alert.   Psychiatric:         Mood and Affect: Mood normal.        Result Review " :                   Assessment and Plan   Diagnoses and all orders for this visit:    1. Moderate recurrent major depression (Primary)  Assessment & Plan:  Patient's depression is recurrent and is moderate without psychosis. Their depression is currently active and the condition is unchanged. This will be reassessed in 4 weeks. F/U as described: Patient again advised to reestablish care with her psychiatrist.  Will increase Remeron this date which may help with both appetite and mood .    Orders:  -     mirtazapine (Remeron) 15 MG tablet; Take 1 tab po QHS for mood/appetite for 2 weeks then increAse to 2 tabs QHS, caution sedation.  Dispense: 60 tablet; Refill: 1    2. Weight loss, unintentional  Assessment & Plan:  Patient's weight is stable this date.  She does report eating more often since starting medication.  Will increase dosage    Orders:  -     mirtazapine (Remeron) 15 MG tablet; Take 1 tab po QHS for mood/appetite for 2 weeks then increAse to 2 tabs QHS, caution sedation.  Dispense: 60 tablet; Refill: 1    3. Hyperlipidemia LDL goal <100  Assessment & Plan:  Provided patient with medication refills    Orders:  -     rosuvastatin (CRESTOR) 20 MG tablet; Take 1 tablet by mouth Daily. For cholesterol  Dispense: 90 tablet; Refill: 1    4. Acquired hypothyroidism  Comments:  We will get blood work set up and refill medication  Assessment & Plan:  Refill patient's medication levothyroxine    Orders:  -     levothyroxine (SYNTHROID, LEVOTHROID) 50 MCG tablet; Take 1 tablet by mouth Daily.  Dispense: 90 tablet; Refill: 1    5. Encounter for immunization  Assessment & Plan:  Patient provided with flu shot today      6. Acute ear pain, right  Assessment & Plan:  Pain is secondary to effusion advised patient to use Flonase for relief      Other orders  -     Fluzone High-Dose 65+yrs ([id number])             Follow Up   Return in about 4 weeks (around 1/5/2024).  Patient was given instructions and counseling regarding  her condition or for health maintenance advice. Please see specific information pulled into the AVS if appropriate.

## 2023-12-08 NOTE — ASSESSMENT & PLAN NOTE
Patient's weight is stable this date.  She does report eating more often since starting medication.  Will increase dosage

## 2024-01-18 ENCOUNTER — OFFICE VISIT (OUTPATIENT)
Dept: FAMILY MEDICINE CLINIC | Facility: CLINIC | Age: 74
End: 2024-01-18
Payer: MEDICARE

## 2024-01-18 VITALS
HEIGHT: 64 IN | WEIGHT: 108 LBS | BODY MASS INDEX: 18.44 KG/M2 | DIASTOLIC BLOOD PRESSURE: 60 MMHG | SYSTOLIC BLOOD PRESSURE: 102 MMHG

## 2024-01-18 DIAGNOSIS — R63.4 WEIGHT LOSS, UNINTENTIONAL: ICD-10-CM

## 2024-01-18 DIAGNOSIS — F33.1 MODERATE RECURRENT MAJOR DEPRESSION: ICD-10-CM

## 2024-01-18 PROCEDURE — 1159F MED LIST DOCD IN RCRD: CPT | Performed by: PHYSICIAN ASSISTANT

## 2024-01-18 PROCEDURE — 1160F RVW MEDS BY RX/DR IN RCRD: CPT | Performed by: PHYSICIAN ASSISTANT

## 2024-01-18 PROCEDURE — 99213 OFFICE O/P EST LOW 20 MIN: CPT | Performed by: PHYSICIAN ASSISTANT

## 2024-01-18 RX ORDER — MIRTAZAPINE 15 MG/1
TABLET, FILM COATED ORAL
Qty: 30 TABLET | Refills: 1 | Status: SHIPPED | OUTPATIENT
Start: 2024-01-18

## 2024-01-18 RX ORDER — MIRTAZAPINE 30 MG/1
30 TABLET, FILM COATED ORAL NIGHTLY
Qty: 90 TABLET | Refills: 1 | Status: SHIPPED | OUTPATIENT
Start: 2024-01-18

## 2024-01-18 NOTE — ASSESSMENT & PLAN NOTE
Patient's weight has improved by 4 pounds since last visit.  Will increase Remeron to 45 mg.  Again encouraged patient to start using protein supplements

## 2024-01-18 NOTE — ASSESSMENT & PLAN NOTE
Patient's depression is recurrent and is moderate without psychosis. Their depression is currently active and the condition is improving with treatment. This will be reassessed in 4 weeks. F/U as described: Increase Remeron to 45 mg.  Discussed with patient and daughter possibility of her getting a pet as a  .

## 2024-01-18 NOTE — PROGRESS NOTES
"Chief Complaint  Depression    Subjective        Glenda Brown presents to [facility]  History of Present Illness  Patient reports today for follow-up secondary to having depression and decreased appetite.  Patient states she has been taking 30 mg of Remeron nightly.  Patient states she is now feeling hungry throughout the day.  Patient reports she is actually eating meals however small portions.  Patient daughter states she is more motivated to eat and feels that her mood has improved.  Patient reports she still has depression however feels that it has decreased with the medication.  Patient reports she lives alone and her mood starts to decrease if she has not had any visitors or been out of the house for about 3 to 4 days.  Depression      Objective   Vital Signs:  /60   Ht 162.6 cm (64\")   Wt 49 kg (108 lb)   BMI 18.54 kg/m²   Estimated body mass index is 18.54 kg/m² as calculated from the following:    Height as of this encounter: 162.6 cm (64\").    Weight as of this encounter: 49 kg (108 lb).       BMI is within normal parameters. No other follow-up for BMI required.      Physical Exam  Vitals and nursing note reviewed.   Constitutional:       General: She is not in acute distress.     Appearance: Normal appearance. She is ill-appearing.   HENT:      Head: Normocephalic.      Right Ear: Hearing normal.      Left Ear: Hearing normal.   Eyes:      Pupils: Pupils are equal, round, and reactive to light.   Cardiovascular:      Rate and Rhythm: Normal rate and regular rhythm.   Pulmonary:      Effort: Pulmonary effort is normal. No respiratory distress.   Skin:     General: Skin is warm and dry.   Neurological:      Mental Status: She is alert.   Psychiatric:         Mood and Affect: Mood normal.        Result Review :                     Assessment and Plan     Diagnoses and all orders for this visit:    1. Moderate recurrent major depression  Assessment & Plan:  Patient's " depression is recurrent and is moderate without psychosis. Their depression is currently active and the condition is improving with treatment. This will be reassessed in 4 weeks. F/U as described: Increase Remeron to 45 mg.  Discussed with patient and daughter possibility of her getting a pet as a  .    Orders:  -     mirtazapine (Remeron) 30 MG tablet; Take 1 tablet by mouth Every Night. For mood and appetite caution sedation  Dispense: 90 tablet; Refill: 1  -     mirtazapine (Remeron) 15 MG tablet; Take 1 tab po QHS for mood/appetite along with 30mg tabs  Dispense: 30 tablet; Refill: 1    2. Weight loss, unintentional  Assessment & Plan:  Patient's weight has improved by 4 pounds since last visit.  Will increase Remeron to 45 mg.  Again encouraged patient to start using protein supplements    Orders:  -     mirtazapine (Remeron) 30 MG tablet; Take 1 tablet by mouth Every Night. For mood and appetite caution sedation  Dispense: 90 tablet; Refill: 1  -     mirtazapine (Remeron) 15 MG tablet; Take 1 tab po QHS for mood/appetite along with 30mg tabs  Dispense: 30 tablet; Refill: 1             Follow Up     Return in about 4 weeks (around 2/15/2024).  Patient was given instructions and counseling regarding her condition or for health maintenance advice. Please see specific information pulled into the AVS if appropriate.

## 2024-03-30 DIAGNOSIS — R63.4 WEIGHT LOSS, UNINTENTIONAL: ICD-10-CM

## 2024-03-30 DIAGNOSIS — F33.1 MODERATE RECURRENT MAJOR DEPRESSION: ICD-10-CM

## 2024-04-03 RX ORDER — MIRTAZAPINE 15 MG/1
TABLET, FILM COATED ORAL
Qty: 90 TABLET | Refills: 1 | Status: SHIPPED | OUTPATIENT
Start: 2024-04-03

## 2024-04-05 ENCOUNTER — OFFICE VISIT (OUTPATIENT)
Dept: FAMILY MEDICINE CLINIC | Facility: CLINIC | Age: 74
End: 2024-04-05
Payer: MEDICARE

## 2024-04-05 ENCOUNTER — TELEPHONE (OUTPATIENT)
Dept: FAMILY MEDICINE CLINIC | Facility: CLINIC | Age: 74
End: 2024-04-05

## 2024-04-05 VITALS
OXYGEN SATURATION: 97 % | WEIGHT: 105 LBS | DIASTOLIC BLOOD PRESSURE: 70 MMHG | HEIGHT: 64 IN | SYSTOLIC BLOOD PRESSURE: 100 MMHG | HEART RATE: 64 BPM | BODY MASS INDEX: 17.93 KG/M2

## 2024-04-05 DIAGNOSIS — R63.4 WEIGHT LOSS, UNINTENTIONAL: ICD-10-CM

## 2024-04-05 DIAGNOSIS — R82.90 MALODOROUS URINE: Primary | ICD-10-CM

## 2024-04-05 DIAGNOSIS — R11.0 NAUSEA: ICD-10-CM

## 2024-04-05 DIAGNOSIS — F33.1 MODERATE RECURRENT MAJOR DEPRESSION: ICD-10-CM

## 2024-04-05 PROCEDURE — 99213 OFFICE O/P EST LOW 20 MIN: CPT | Performed by: PHYSICIAN ASSISTANT

## 2024-04-05 PROCEDURE — 1159F MED LIST DOCD IN RCRD: CPT | Performed by: PHYSICIAN ASSISTANT

## 2024-04-05 PROCEDURE — 1160F RVW MEDS BY RX/DR IN RCRD: CPT | Performed by: PHYSICIAN ASSISTANT

## 2024-04-05 RX ORDER — ONDANSETRON 4 MG/1
4 TABLET, FILM COATED ORAL EVERY 8 HOURS PRN
Qty: 30 TABLET | Refills: 0 | Status: SHIPPED | OUTPATIENT
Start: 2024-04-05

## 2024-04-05 RX ORDER — MIRTAZAPINE 30 MG/1
30 TABLET, FILM COATED ORAL NIGHTLY
Qty: 90 TABLET | Refills: 1 | Status: SHIPPED | OUTPATIENT
Start: 2024-04-05

## 2024-04-05 NOTE — PROGRESS NOTES
"Chief Complaint  Difficulty Urinating (Urine has a odor  ) and Nausea    Subjective        Glenda Brown presents to [facility]  History of Present Illness  Patient reports today secondary to having a odor to her urine for the past week.  Patient reports she has had difficulty emptying her urine.  Patient also reports she has been nauseated and felt some fatigue.  Patient believes she may have a urinary tract infection.  Denies any blood in her urine.  Denies any back pain.  Denies any fever or chills.  Difficulty Urinating   Associated symptoms include nausea.   Nausea  Associated symptoms include nausea.       Objective   Vital Signs:  /70   Pulse 64   Ht 162.6 cm (64\")   Wt 47.6 kg (105 lb)   SpO2 97%   BMI 18.02 kg/m²   Estimated body mass index is 18.02 kg/m² as calculated from the following:    Height as of this encounter: 162.6 cm (64\").    Weight as of this encounter: 47.6 kg (105 lb).             Physical Exam  Vitals and nursing note reviewed.   Constitutional:       General: She is not in acute distress.     Appearance: She is not ill-appearing.   HENT:      Mouth/Throat:      Pharynx: No oropharyngeal exudate.   Eyes:      Pupils: Pupils are equal, round, and reactive to light.   Cardiovascular:      Rate and Rhythm: Normal rate and regular rhythm.   Pulmonary:      Effort: Pulmonary effort is normal. No respiratory distress.   Abdominal:      Tenderness: There is no right CVA tenderness or left CVA tenderness.   Musculoskeletal:         General: Normal range of motion.   Skin:     General: Skin is warm and dry.   Psychiatric:         Mood and Affect: Mood normal.        Result Review :                     Assessment and Plan     Diagnoses and all orders for this visit:    1. Malodorous urine (Primary)  Assessment & Plan:  Patient urine was unremarkable this date however given her symptom along with nausea and fatigue will prescribe antibiotic and send urine " off for culture.  Patient prescribed Ceftin advised her to increase fluids as tolerated.  Call if any problems arise    Orders:  -     POC Urinalysis Dipstick, Automated  -     Urine Culture - Urine, Urine, Clean Catch; Future    2. Nausea  Assessment & Plan:  Patient prescribed Zofran    Orders:  -     ondansetron (Zofran) 4 MG tablet; Take 1 tablet by mouth Every 8 (Eight) Hours As Needed for Nausea or Vomiting.  Dispense: 30 tablet; Refill: 0    3. Moderate recurrent major depression  Assessment & Plan:  Refill Remeron    Orders:  -     mirtazapine (Remeron) 30 MG tablet; Take 1 tablet by mouth Every Night. For mood and appetite caution sedation  Dispense: 90 tablet; Refill: 1    4. Weight loss, unintentional  Assessment & Plan:  Refill patient's Remeron    Orders:  -     mirtazapine (Remeron) 30 MG tablet; Take 1 tablet by mouth Every Night. For mood and appetite caution sedation  Dispense: 90 tablet; Refill: 1             Follow Up     Return if symptoms worsen or fail to improve.  Patient was given instructions and counseling regarding her condition or for health maintenance advice. Please see specific information pulled into the AVS if appropriate.

## 2024-04-05 NOTE — ASSESSMENT & PLAN NOTE
Patient urine was unremarkable this date however given her symptom along with nausea and fatigue will prescribe antibiotic and send urine off for culture.  Patient prescribed Ceftin advised her to increase fluids as tolerated.  Call if any problems arise

## 2024-04-06 DIAGNOSIS — R82.90 MALODOROUS URINE: Primary | ICD-10-CM

## 2024-04-06 RX ORDER — CEFUROXIME AXETIL 250 MG/1
250 TABLET ORAL 2 TIMES DAILY
Qty: 10 TABLET | Refills: 0 | Status: SHIPPED | OUTPATIENT
Start: 2024-04-06

## 2024-04-07 LAB
BACTERIA UR CULT: NORMAL
BACTERIA UR CULT: NORMAL

## 2024-05-21 DIAGNOSIS — E03.9 ACQUIRED HYPOTHYROIDISM: Primary | ICD-10-CM

## 2024-05-21 RX ORDER — LEVOTHYROXINE SODIUM 0.07 MG/1
75 TABLET ORAL DAILY
Qty: 90 TABLET | Refills: 0 | Status: SHIPPED | OUTPATIENT
Start: 2024-05-21

## 2024-06-06 ENCOUNTER — READMISSION MANAGEMENT (OUTPATIENT)
Dept: CALL CENTER | Facility: HOSPITAL | Age: 74
End: 2024-06-06
Payer: MEDICARE

## 2024-06-06 NOTE — OUTREACH NOTE
Prep Survey      Flowsheet Row Responses   [facility] facility patient discharged from? Non-[facility]   Is LACE score < 7 ? Non-BH Discharge   Eligibility [facility]   Date of Discharge 06/06/24   Discharge Disposition Home or Self Care   Discharge diagnosis N/V/D/C   Does the patient have one of the following disease processes/diagnoses(primary or secondary)? Other   Prep survey completed? Yes            Joycelyn CHEW - Registered Nurse

## 2024-06-07 ENCOUNTER — TRANSITIONAL CARE MANAGEMENT TELEPHONE ENCOUNTER (OUTPATIENT)
Dept: CALL CENTER | Facility: HOSPITAL | Age: 74
End: 2024-06-07
Payer: MEDICARE

## 2024-06-07 NOTE — OUTREACH NOTE
Call Center TCM Note      Flowsheet Row Responses   [facility] patient discharged from? Non-   Does the patient have one of the following disease processes/diagnoses(primary or secondary)? Other   TCM attempt successful? Yes   Call start time 0930   Call end time 0932   Discharge diagnosis N/V/D/C   Meds reviewed with patient/caregiver? Yes   Is the patient taking all medications as directed (includes completed medication regime)? Yes   Does the patient have an appointment with their PCP within 7-14 days of discharge? No   Nursing Interventions Patient declined scheduling/rescheduling appointment at this time, Patient desires to follow up with specialty only   Has home health visited the patient within 72 hours of discharge? N/A   Psychosocial issues? No   What is the patient's perception of their health status since discharge? Improving   Is the patient/caregiver able to teach back signs and symptoms related to disease process for when to call PCP? Yes   Is the patient/caregiver able to teach back signs and symptoms related to disease process for when to call 911? Yes   Is the patient/caregiver able to teach back the hierarchy of who to call/visit for symptoms/problems? PCP, Specialist, Home health nurse, Urgent Care, ED, 911 Yes   If the patient is a current smoker, are they able to teach back resources for cessation? Not a smoker   TCM call completed? Yes   Call end time 0932   Would this patient benefit from a Referral to Amb Social Work? No   Is the patient interested in additional calls from an ambulatory ? No            Kaye Kaminski LPN    6/7/2024, 09:39 EDT

## 2024-07-18 DIAGNOSIS — E03.9 ACQUIRED HYPOTHYROIDISM: ICD-10-CM

## 2024-07-18 RX ORDER — LEVOTHYROXINE SODIUM 0.07 MG/1
75 TABLET ORAL DAILY
Qty: 90 TABLET | Refills: 0 | Status: SHIPPED | OUTPATIENT
Start: 2024-07-18

## 2024-10-21 DIAGNOSIS — F33.1 MODERATE RECURRENT MAJOR DEPRESSION: ICD-10-CM

## 2024-10-21 DIAGNOSIS — R63.4 WEIGHT LOSS, UNINTENTIONAL: ICD-10-CM

## 2024-10-21 RX ORDER — MIRTAZAPINE 15 MG/1
TABLET, FILM COATED ORAL
Qty: 90 TABLET | Refills: 0 | Status: SHIPPED | OUTPATIENT
Start: 2024-10-21

## 2024-12-18 DIAGNOSIS — E03.9 ACQUIRED HYPOTHYROIDISM: ICD-10-CM

## 2024-12-18 RX ORDER — LEVOTHYROXINE SODIUM 75 UG/1
75 TABLET ORAL DAILY
Qty: 90 TABLET | Refills: 0 | Status: SHIPPED | OUTPATIENT
Start: 2024-12-18

## 2025-03-19 ENCOUNTER — OFFICE VISIT (OUTPATIENT)
Dept: FAMILY MEDICINE CLINIC | Facility: CLINIC | Age: 75
End: 2025-03-19
Payer: MEDICARE

## 2025-03-19 VITALS
BODY MASS INDEX: 17.19 KG/M2 | WEIGHT: 100.7 LBS | DIASTOLIC BLOOD PRESSURE: 68 MMHG | SYSTOLIC BLOOD PRESSURE: 110 MMHG | OXYGEN SATURATION: 97 % | HEART RATE: 80 BPM | HEIGHT: 64 IN

## 2025-03-19 DIAGNOSIS — R11.0 NAUSEA: Primary | ICD-10-CM

## 2025-03-19 DIAGNOSIS — E53.9 VITAMIN B DEFICIENCY: ICD-10-CM

## 2025-03-19 DIAGNOSIS — E03.9 ACQUIRED HYPOTHYROIDISM: ICD-10-CM

## 2025-03-19 DIAGNOSIS — R11.0 CHRONIC NAUSEA: ICD-10-CM

## 2025-03-19 DIAGNOSIS — E78.5 HYPERLIPIDEMIA LDL GOAL <100: ICD-10-CM

## 2025-03-19 DIAGNOSIS — R63.6 UNDERWEIGHT (BMI < 18.5): ICD-10-CM

## 2025-03-19 RX ORDER — ROSUVASTATIN CALCIUM 20 MG/1
20 TABLET, COATED ORAL DAILY
Qty: 90 TABLET | Refills: 1 | Status: SHIPPED | OUTPATIENT
Start: 2025-03-19

## 2025-03-19 RX ORDER — OMEPRAZOLE 20 MG/1
20 CAPSULE, DELAYED RELEASE ORAL DAILY
Qty: 30 CAPSULE | Refills: 3 | Status: SHIPPED | OUTPATIENT
Start: 2025-03-19

## 2025-03-19 RX ORDER — LEVOTHYROXINE SODIUM 75 UG/1
75 TABLET ORAL DAILY
Qty: 90 TABLET | Refills: 0 | Status: CANCELLED | OUTPATIENT
Start: 2025-03-19

## 2025-03-19 RX ORDER — PROMETHAZINE HYDROCHLORIDE 25 MG/1
TABLET ORAL
Qty: 90 TABLET | Refills: 5 | Status: SHIPPED | OUTPATIENT
Start: 2025-03-19

## 2025-03-19 RX ORDER — ONDANSETRON 4 MG/1
4 TABLET, FILM COATED ORAL EVERY 8 HOURS PRN
Qty: 30 TABLET | Refills: 5 | Status: SHIPPED | OUTPATIENT
Start: 2025-03-19

## 2025-03-20 LAB — TSH SERPL DL<=0.005 MIU/L-ACNC: 0.02 UIU/ML (ref 0.45–4.5)

## 2025-03-27 DIAGNOSIS — E03.9 ACQUIRED HYPOTHYROIDISM: Primary | ICD-10-CM

## 2025-03-27 RX ORDER — LEVOTHYROXINE SODIUM 50 UG/1
50 TABLET ORAL
Qty: 90 TABLET | Refills: 1 | Status: SHIPPED | OUTPATIENT
Start: 2025-03-27

## 2025-08-26 ENCOUNTER — TELEPHONE (OUTPATIENT)
Dept: FAMILY MEDICINE CLINIC | Facility: CLINIC | Age: 75
End: 2025-08-26
Payer: MEDICARE

## 2025-08-28 ENCOUNTER — TELEPHONE (OUTPATIENT)
Dept: FAMILY MEDICINE CLINIC | Facility: CLINIC | Age: 75
End: 2025-08-28
Payer: MEDICARE

## (undated) DEVICE — CANNULA,ADULT,SOFT-TOUCH,7TUBE,SC: Brand: MEDLINE

## (undated) DEVICE — ANTIBACTERIAL UNDYED BRAIDED (POLYGLACTIN 910), SYNTHETIC ABSORBABLE SUTURE: Brand: COATED VICRYL

## (undated) DEVICE — 3.0MM PRECISION NEURO (MATCH HEAD)

## (undated) DEVICE — DRSNG SURESITE123 4X4.8IN

## (undated) DEVICE — SUT VIC 0 UR5 27IN VCP376H

## (undated) DEVICE — SYS SKIN CLS DERMABOND PRINEO W/22CM MESH TP

## (undated) DEVICE — AIRWY 90MM NO9

## (undated) DEVICE — PK NEURO DISC 10

## (undated) DEVICE — ENCORE® LATEX MICRO SIZE 8, STERILE LATEX POWDER-FREE SURGICAL GLOVE: Brand: ENCORE

## (undated) DEVICE — MEDI-VAC NON-CONDUCTIVE SUCTION TUBING: Brand: CARDINAL HEALTH

## (undated) DEVICE — ENCORE® LATEX MICRO SIZE 7, STERILE LATEX POWDER-FREE SURGICAL GLOVE: Brand: ENCORE

## (undated) DEVICE — FLTR HME STR UNIV W/SMPL PORT

## (undated) DEVICE — TB SXN FRAZIER 12F STRL

## (undated) DEVICE — MEDI-VAC YANKAUER SUCTION HANDLE W/BULBOUS TIP: Brand: CARDINAL HEALTH

## (undated) DEVICE — SNAP KOVER: Brand: UNBRANDED

## (undated) DEVICE — ENCORE® LATEX MICRO SIZE 6.5, STERILE LATEX POWDER-FREE SURGICAL GLOVE: Brand: ENCORE

## (undated) DEVICE — SENSR O2 OXIMAX FNGR A/ 18IN NONSTR

## (undated) DEVICE — SOL LR 1000ML